# Patient Record
Sex: MALE | Race: OTHER | NOT HISPANIC OR LATINO | URBAN - METROPOLITAN AREA
[De-identification: names, ages, dates, MRNs, and addresses within clinical notes are randomized per-mention and may not be internally consistent; named-entity substitution may affect disease eponyms.]

---

## 2021-08-16 ENCOUNTER — INPATIENT (INPATIENT)
Facility: HOSPITAL | Age: 26
LOS: 6 days | Discharge: HOME | DRG: 917 | End: 2021-08-23
Attending: HOSPITALIST | Admitting: INTERNAL MEDICINE
Payer: MEDICAID

## 2021-08-16 VITALS
TEMPERATURE: 98 F | RESPIRATION RATE: 12 BRPM | DIASTOLIC BLOOD PRESSURE: 66 MMHG | SYSTOLIC BLOOD PRESSURE: 110 MMHG | HEART RATE: 159 BPM | OXYGEN SATURATION: 100 %

## 2021-08-16 DIAGNOSIS — J96.01 ACUTE RESPIRATORY FAILURE WITH HYPOXIA: ICD-10-CM

## 2021-08-16 DIAGNOSIS — E87.6 HYPOKALEMIA: ICD-10-CM

## 2021-08-16 DIAGNOSIS — T44.3X2A POISONING BY OTHER PARASYMPATHOLYTICS [ANTICHOLINERGICS AND ANTIMUSCARINICS] AND SPASMOLYTICS, INTENTIONAL SELF-HARM, INITIAL ENCOUNTER: ICD-10-CM

## 2021-08-16 DIAGNOSIS — K71.10 TOXIC LIVER DISEASE WITH HEPATIC NECROSIS, WITHOUT COMA: ICD-10-CM

## 2021-08-16 DIAGNOSIS — E83.42 HYPOMAGNESEMIA: ICD-10-CM

## 2021-08-16 DIAGNOSIS — F22 DELUSIONAL DISORDERS: ICD-10-CM

## 2021-08-16 DIAGNOSIS — T43.622A POISONING BY AMPHETAMINES, INTENTIONAL SELF-HARM, INITIAL ENCOUNTER: ICD-10-CM

## 2021-08-16 DIAGNOSIS — D72.829 ELEVATED WHITE BLOOD CELL COUNT, UNSPECIFIED: ICD-10-CM

## 2021-08-16 DIAGNOSIS — R41.82 ALTERED MENTAL STATUS, UNSPECIFIED: ICD-10-CM

## 2021-08-16 DIAGNOSIS — F10.10 ALCOHOL ABUSE, UNCOMPLICATED: ICD-10-CM

## 2021-08-16 DIAGNOSIS — M62.82 RHABDOMYOLYSIS: ICD-10-CM

## 2021-08-16 DIAGNOSIS — F19.159 OTHER PSYCHOACTIVE SUBSTANCE ABUSE WITH PSYCHOACTIVE SUBSTANCE-INDUCED PSYCHOTIC DISORDER, UNSPECIFIED: ICD-10-CM

## 2021-08-16 DIAGNOSIS — I51.7 CARDIOMEGALY: ICD-10-CM

## 2021-08-16 DIAGNOSIS — D64.9 ANEMIA, UNSPECIFIED: ICD-10-CM

## 2021-08-16 DIAGNOSIS — T39.1X2A POISONING BY 4-AMINOPHENOL DERIVATIVES, INTENTIONAL SELF-HARM, INITIAL ENCOUNTER: ICD-10-CM

## 2021-08-16 DIAGNOSIS — G93.41 METABOLIC ENCEPHALOPATHY: ICD-10-CM

## 2021-08-16 DIAGNOSIS — T50.6X2A: ICD-10-CM

## 2021-08-16 LAB
ALBUMIN SERPL ELPH-MCNC: 4.5 G/DL — SIGNIFICANT CHANGE UP (ref 3.4–5)
ALBUMIN SERPL ELPH-MCNC: 4.7 G/DL — SIGNIFICANT CHANGE UP (ref 3.3–5)
ALP SERPL-CCNC: 54 U/L — SIGNIFICANT CHANGE UP (ref 40–120)
ALP SERPL-CCNC: 60 U/L — SIGNIFICANT CHANGE UP (ref 40–120)
ALT FLD-CCNC: 32 U/L — SIGNIFICANT CHANGE UP (ref 10–45)
ALT FLD-CCNC: 48 U/L — HIGH (ref 12–42)
AMPHET UR-MCNC: POSITIVE
ANION GAP SERPL CALC-SCNC: 20 MMOL/L — HIGH (ref 5–17)
ANION GAP SERPL CALC-SCNC: 26 MMOL/L — HIGH (ref 9–16)
APAP SERPL-MCNC: >300 UG/ML — CRITICAL HIGH (ref 10–30)
APPEARANCE UR: CLEAR — SIGNIFICANT CHANGE UP
APTT BLD: 32.3 SEC — SIGNIFICANT CHANGE UP (ref 27.5–35.5)
AST SERPL-CCNC: 78 U/L — HIGH (ref 10–40)
AST SERPL-CCNC: 99 U/L — HIGH (ref 15–37)
BACTERIA # UR AUTO: PRESENT /HPF
BARBITURATES UR SCN-MCNC: NEGATIVE — SIGNIFICANT CHANGE UP
BASOPHILS # BLD AUTO: 0.04 K/UL — SIGNIFICANT CHANGE UP (ref 0–0.2)
BASOPHILS # BLD AUTO: 0.05 K/UL — SIGNIFICANT CHANGE UP (ref 0–0.2)
BASOPHILS NFR BLD AUTO: 0.4 % — SIGNIFICANT CHANGE UP (ref 0–2)
BASOPHILS NFR BLD AUTO: 0.6 % — SIGNIFICANT CHANGE UP (ref 0–2)
BENZODIAZ UR-MCNC: NEGATIVE — SIGNIFICANT CHANGE UP
BILIRUB SERPL-MCNC: 0.8 MG/DL — SIGNIFICANT CHANGE UP (ref 0.2–1.2)
BILIRUB SERPL-MCNC: 0.9 MG/DL — SIGNIFICANT CHANGE UP (ref 0.2–1.2)
BILIRUB UR-MCNC: NEGATIVE — SIGNIFICANT CHANGE UP
BLD GP AB SCN SERPL QL: NEGATIVE — SIGNIFICANT CHANGE UP
BLD GP AB SCN SERPL QL: NEGATIVE — SIGNIFICANT CHANGE UP
BUN SERPL-MCNC: 10 MG/DL — SIGNIFICANT CHANGE UP (ref 7–23)
BUN SERPL-MCNC: 6 MG/DL — LOW (ref 7–23)
CALCIUM SERPL-MCNC: 7.6 MG/DL — LOW (ref 8.4–10.5)
CALCIUM SERPL-MCNC: 9.1 MG/DL — SIGNIFICANT CHANGE UP (ref 8.5–10.5)
CHLORIDE SERPL-SCNC: 97 MMOL/L — SIGNIFICANT CHANGE UP (ref 96–108)
CHLORIDE SERPL-SCNC: 98 MMOL/L — SIGNIFICANT CHANGE UP (ref 96–108)
CK SERPL-CCNC: 2534 U/L — HIGH (ref 30–200)
CO2 SERPL-SCNC: 14 MMOL/L — LOW (ref 22–31)
CO2 SERPL-SCNC: 20 MMOL/L — LOW (ref 22–31)
COCAINE METAB.OTHER UR-MCNC: NEGATIVE — SIGNIFICANT CHANGE UP
COLOR SPEC: YELLOW — SIGNIFICANT CHANGE UP
CREAT SERPL-MCNC: 0.62 MG/DL — SIGNIFICANT CHANGE UP (ref 0.5–1.3)
CREAT SERPL-MCNC: 1.16 MG/DL — SIGNIFICANT CHANGE UP (ref 0.5–1.3)
DIFF PNL FLD: NEGATIVE — SIGNIFICANT CHANGE UP
EOSINOPHIL # BLD AUTO: 0.04 K/UL — SIGNIFICANT CHANGE UP (ref 0–0.5)
EOSINOPHIL # BLD AUTO: 0.19 K/UL — SIGNIFICANT CHANGE UP (ref 0–0.5)
EOSINOPHIL NFR BLD AUTO: 0.4 % — SIGNIFICANT CHANGE UP (ref 0–6)
EOSINOPHIL NFR BLD AUTO: 2.5 % — SIGNIFICANT CHANGE UP (ref 0–6)
EPI CELLS # UR: ABNORMAL /HPF (ref 0–5)
ETHANOL SERPL-MCNC: 82 MG/DL — HIGH
GAS PNL BLDA: SIGNIFICANT CHANGE UP
GLUCOSE BLDC GLUCOMTR-MCNC: 275 MG/DL — HIGH (ref 70–99)
GLUCOSE SERPL-MCNC: 163 MG/DL — HIGH (ref 70–99)
GLUCOSE SERPL-MCNC: 181 MG/DL — HIGH (ref 70–99)
GLUCOSE UR QL: NEGATIVE — SIGNIFICANT CHANGE UP
HCT VFR BLD CALC: 42.9 % — SIGNIFICANT CHANGE UP (ref 39–50)
HCT VFR BLD CALC: 45 % — SIGNIFICANT CHANGE UP (ref 39–50)
HGB BLD-MCNC: 14.8 G/DL — SIGNIFICANT CHANGE UP (ref 13–17)
HGB BLD-MCNC: 15.8 G/DL — SIGNIFICANT CHANGE UP (ref 13–17)
HYALINE CASTS # UR AUTO: ABNORMAL /LPF (ref 0–2)
IMM GRANULOCYTES NFR BLD AUTO: 0.2 % — SIGNIFICANT CHANGE UP (ref 0–1.5)
IMM GRANULOCYTES NFR BLD AUTO: 0.5 % — SIGNIFICANT CHANGE UP (ref 0–1.5)
INR BLD: 1.01 — SIGNIFICANT CHANGE UP (ref 0.88–1.16)
KETONES UR-MCNC: 15 MG/DL
LACTATE SERPL-SCNC: 10 MMOL/L — CRITICAL HIGH (ref 0.4–2)
LACTATE SERPL-SCNC: 4.7 MMOL/L — CRITICAL HIGH (ref 0.5–2)
LACTATE SERPL-SCNC: 7.1 MMOL/L — CRITICAL HIGH (ref 0.4–2)
LEUKOCYTE ESTERASE UR-ACNC: NEGATIVE — SIGNIFICANT CHANGE UP
LYMPHOCYTES # BLD AUTO: 0.83 K/UL — LOW (ref 1–3.3)
LYMPHOCYTES # BLD AUTO: 2.94 K/UL — SIGNIFICANT CHANGE UP (ref 1–3.3)
LYMPHOCYTES # BLD AUTO: 38.2 % — SIGNIFICANT CHANGE UP (ref 13–44)
LYMPHOCYTES # BLD AUTO: 9.3 % — LOW (ref 13–44)
MAGNESIUM SERPL-MCNC: 1 MG/DL — CRITICAL LOW (ref 1.6–2.6)
MAGNESIUM SERPL-MCNC: 1.8 MG/DL — SIGNIFICANT CHANGE UP (ref 1.6–2.6)
MCHC RBC-ENTMCNC: 33.4 PG — SIGNIFICANT CHANGE UP (ref 27–34)
MCHC RBC-ENTMCNC: 33.5 PG — SIGNIFICANT CHANGE UP (ref 27–34)
MCHC RBC-ENTMCNC: 34.5 GM/DL — SIGNIFICANT CHANGE UP (ref 32–36)
MCHC RBC-ENTMCNC: 35.1 GM/DL — SIGNIFICANT CHANGE UP (ref 32–36)
MCV RBC AUTO: 95.5 FL — SIGNIFICANT CHANGE UP (ref 80–100)
MCV RBC AUTO: 96.8 FL — SIGNIFICANT CHANGE UP (ref 80–100)
METHADONE UR-MCNC: NEGATIVE — SIGNIFICANT CHANGE UP
MONOCYTES # BLD AUTO: 0.56 K/UL — SIGNIFICANT CHANGE UP (ref 0–0.9)
MONOCYTES # BLD AUTO: 0.75 K/UL — SIGNIFICANT CHANGE UP (ref 0–0.9)
MONOCYTES NFR BLD AUTO: 6.3 % — SIGNIFICANT CHANGE UP (ref 2–14)
MONOCYTES NFR BLD AUTO: 9.7 % — SIGNIFICANT CHANGE UP (ref 2–14)
NEUTROPHILS # BLD AUTO: 3.73 K/UL — SIGNIFICANT CHANGE UP (ref 1.8–7.4)
NEUTROPHILS # BLD AUTO: 7.42 K/UL — HIGH (ref 1.8–7.4)
NEUTROPHILS NFR BLD AUTO: 48.5 % — SIGNIFICANT CHANGE UP (ref 43–77)
NEUTROPHILS NFR BLD AUTO: 83.4 % — HIGH (ref 43–77)
NITRITE UR-MCNC: NEGATIVE — SIGNIFICANT CHANGE UP
NRBC # BLD: 0 /100 WBCS — SIGNIFICANT CHANGE UP (ref 0–0)
NRBC # BLD: 0 /100 WBCS — SIGNIFICANT CHANGE UP (ref 0–0)
OPIATES UR-MCNC: NEGATIVE — SIGNIFICANT CHANGE UP
PCO2 BLDA: 36 MMHG — SIGNIFICANT CHANGE UP (ref 35–48)
PCP SPEC-MCNC: SIGNIFICANT CHANGE UP
PCP UR-MCNC: NEGATIVE — SIGNIFICANT CHANGE UP
PH BLDA: 7.38 — SIGNIFICANT CHANGE UP (ref 7.35–7.45)
PH UR: 5.5 — SIGNIFICANT CHANGE UP (ref 5–8)
PHOSPHATE SERPL-MCNC: 2.8 MG/DL — SIGNIFICANT CHANGE UP (ref 2.5–4.5)
PLATELET # BLD AUTO: 260 K/UL — SIGNIFICANT CHANGE UP (ref 150–400)
PLATELET # BLD AUTO: 306 K/UL — SIGNIFICANT CHANGE UP (ref 150–400)
PO2 BLDA: 464 MMHG — HIGH (ref 83–108)
POTASSIUM SERPL-MCNC: 2.7 MMOL/L — CRITICAL LOW (ref 3.5–5.3)
POTASSIUM SERPL-MCNC: 3.1 MMOL/L — LOW (ref 3.5–5.3)
POTASSIUM SERPL-SCNC: 2.7 MMOL/L — CRITICAL LOW (ref 3.5–5.3)
POTASSIUM SERPL-SCNC: 3.1 MMOL/L — LOW (ref 3.5–5.3)
PROT SERPL-MCNC: 7.6 G/DL — SIGNIFICANT CHANGE UP (ref 6–8.3)
PROT SERPL-MCNC: 8.7 G/DL — HIGH (ref 6.4–8.2)
PROT UR-MCNC: 100 MG/DL
PROTHROM AB SERPL-ACNC: 12.1 SEC — SIGNIFICANT CHANGE UP (ref 10.6–13.6)
RBC # BLD: 4.43 M/UL — SIGNIFICANT CHANGE UP (ref 4.2–5.8)
RBC # BLD: 4.71 M/UL — SIGNIFICANT CHANGE UP (ref 4.2–5.8)
RBC # FLD: 12 % — SIGNIFICANT CHANGE UP (ref 10.3–14.5)
RBC # FLD: 12.2 % — SIGNIFICANT CHANGE UP (ref 10.3–14.5)
RBC CASTS # UR COMP ASSIST: < 5 /HPF — SIGNIFICANT CHANGE UP
RH IG SCN BLD-IMP: POSITIVE — SIGNIFICANT CHANGE UP
RH IG SCN BLD-IMP: POSITIVE — SIGNIFICANT CHANGE UP
SALICYLATES SERPL-MCNC: 0.5 MG/DL — LOW (ref 2.8–20)
SAO2 % BLDA: 99 % — SIGNIFICANT CHANGE UP (ref 95–100)
SARS-COV-2 RNA SPEC QL NAA+PROBE: SIGNIFICANT CHANGE UP
SODIUM SERPL-SCNC: 137 MMOL/L — SIGNIFICANT CHANGE UP (ref 132–145)
SODIUM SERPL-SCNC: 138 MMOL/L — SIGNIFICANT CHANGE UP (ref 135–145)
SP GR SPEC: >=1.03 — SIGNIFICANT CHANGE UP (ref 1–1.03)
THC UR QL: NEGATIVE — SIGNIFICANT CHANGE UP
TROPONIN I SERPL-MCNC: <0.017 NG/ML — LOW (ref 0.02–0.06)
UROBILINOGEN FLD QL: 0.2 E.U./DL — SIGNIFICANT CHANGE UP
WBC # BLD: 7.7 K/UL — SIGNIFICANT CHANGE UP (ref 3.8–10.5)
WBC # BLD: 8.91 K/UL — SIGNIFICANT CHANGE UP (ref 3.8–10.5)
WBC # FLD AUTO: 7.7 K/UL — SIGNIFICANT CHANGE UP (ref 3.8–10.5)
WBC # FLD AUTO: 8.91 K/UL — SIGNIFICANT CHANGE UP (ref 3.8–10.5)
WBC UR QL: < 5 /HPF — SIGNIFICANT CHANGE UP

## 2021-08-16 PROCEDURE — 70450 CT HEAD/BRAIN W/O DYE: CPT | Mod: 26

## 2021-08-16 PROCEDURE — 99291 CRITICAL CARE FIRST HOUR: CPT

## 2021-08-16 PROCEDURE — 31500 INSERT EMERGENCY AIRWAY: CPT

## 2021-08-16 PROCEDURE — 93010 ELECTROCARDIOGRAM REPORT: CPT

## 2021-08-16 PROCEDURE — 99222 1ST HOSP IP/OBS MODERATE 55: CPT

## 2021-08-16 PROCEDURE — 71045 X-RAY EXAM CHEST 1 VIEW: CPT | Mod: 26,77

## 2021-08-16 PROCEDURE — 99291 CRITICAL CARE FIRST HOUR: CPT | Mod: 25

## 2021-08-16 PROCEDURE — 71045 X-RAY EXAM CHEST 1 VIEW: CPT | Mod: 26

## 2021-08-16 PROCEDURE — 93010 ELECTROCARDIOGRAM REPORT: CPT | Mod: 59,76

## 2021-08-16 RX ORDER — MIDAZOLAM HYDROCHLORIDE 1 MG/ML
0.02 INJECTION, SOLUTION INTRAMUSCULAR; INTRAVENOUS
Qty: 100 | Refills: 0 | Status: DISCONTINUED | OUTPATIENT
Start: 2021-08-16 | End: 2021-08-17

## 2021-08-16 RX ORDER — SODIUM CHLORIDE 9 MG/ML
1000 INJECTION, SOLUTION INTRAVENOUS
Refills: 0 | Status: DISCONTINUED | OUTPATIENT
Start: 2021-08-16 | End: 2021-08-20

## 2021-08-16 RX ORDER — ROCURONIUM BROMIDE 10 MG/ML
72 VIAL (ML) INTRAVENOUS ONCE
Refills: 0 | Status: COMPLETED | OUTPATIENT
Start: 2021-08-16 | End: 2021-08-16

## 2021-08-16 RX ORDER — ACETYLCYSTEINE 200 MG/ML
9 VIAL (ML) MISCELLANEOUS ONCE
Refills: 0 | Status: COMPLETED | OUTPATIENT
Start: 2021-08-16 | End: 2021-08-16

## 2021-08-16 RX ORDER — POTASSIUM CHLORIDE 20 MEQ
10 PACKET (EA) ORAL ONCE
Refills: 0 | Status: COMPLETED | OUTPATIENT
Start: 2021-08-16 | End: 2021-08-16

## 2021-08-16 RX ORDER — GLUCAGON INJECTION, SOLUTION 0.5 MG/.1ML
1 INJECTION, SOLUTION SUBCUTANEOUS ONCE
Refills: 0 | Status: DISCONTINUED | OUTPATIENT
Start: 2021-08-16 | End: 2021-08-20

## 2021-08-16 RX ORDER — ENOXAPARIN SODIUM 100 MG/ML
40 INJECTION SUBCUTANEOUS EVERY 24 HOURS
Refills: 0 | Status: DISCONTINUED | OUTPATIENT
Start: 2021-08-16 | End: 2021-08-23

## 2021-08-16 RX ORDER — MAGNESIUM SULFATE 500 MG/ML
2 VIAL (ML) INJECTION ONCE
Refills: 0 | Status: COMPLETED | OUTPATIENT
Start: 2021-08-16 | End: 2021-08-16

## 2021-08-16 RX ORDER — POTASSIUM CHLORIDE 20 MEQ
10 PACKET (EA) ORAL
Refills: 0 | Status: COMPLETED | OUTPATIENT
Start: 2021-08-16 | End: 2021-08-17

## 2021-08-16 RX ORDER — ACETYLCYSTEINE 200 MG/ML
3 VIAL (ML) MISCELLANEOUS ONCE
Refills: 0 | Status: COMPLETED | OUTPATIENT
Start: 2021-08-16 | End: 2021-08-16

## 2021-08-16 RX ORDER — ACETYLCYSTEINE 200 MG/ML
6 VIAL (ML) MISCELLANEOUS ONCE
Refills: 0 | Status: COMPLETED | OUTPATIENT
Start: 2021-08-17 | End: 2021-08-17

## 2021-08-16 RX ORDER — DEXTROSE 50 % IN WATER 50 %
25 SYRINGE (ML) INTRAVENOUS ONCE
Refills: 0 | Status: DISCONTINUED | OUTPATIENT
Start: 2021-08-16 | End: 2021-08-20

## 2021-08-16 RX ORDER — ETOMIDATE 2 MG/ML
18 INJECTION INTRAVENOUS ONCE
Refills: 0 | Status: COMPLETED | OUTPATIENT
Start: 2021-08-16 | End: 2021-08-16

## 2021-08-16 RX ORDER — DEXTROSE 50 % IN WATER 50 %
15 SYRINGE (ML) INTRAVENOUS ONCE
Refills: 0 | Status: DISCONTINUED | OUTPATIENT
Start: 2021-08-16 | End: 2021-08-20

## 2021-08-16 RX ORDER — SODIUM BICARBONATE 1 MEQ/ML
50 SYRINGE (ML) INTRAVENOUS ONCE
Refills: 0 | Status: COMPLETED | OUTPATIENT
Start: 2021-08-16 | End: 2021-08-16

## 2021-08-16 RX ORDER — ACTIVATED CHARCOAL 25 G/120ML
50 SUSPENSION, ORAL (FINAL DOSE FORM) ORAL ONCE
Refills: 0 | Status: COMPLETED | OUTPATIENT
Start: 2021-08-16 | End: 2021-08-16

## 2021-08-16 RX ORDER — CHLORHEXIDINE GLUCONATE 213 G/1000ML
15 SOLUTION TOPICAL EVERY 12 HOURS
Refills: 0 | Status: DISCONTINUED | OUTPATIENT
Start: 2021-08-16 | End: 2021-08-18

## 2021-08-16 RX ORDER — SODIUM BICARBONATE 1 MEQ/ML
0.38 SYRINGE (ML) INTRAVENOUS
Qty: 150 | Refills: 0 | Status: DISCONTINUED | OUTPATIENT
Start: 2021-08-16 | End: 2021-08-17

## 2021-08-16 RX ORDER — POTASSIUM CHLORIDE 20 MEQ
40 PACKET (EA) ORAL ONCE
Refills: 0 | Status: COMPLETED | OUTPATIENT
Start: 2021-08-16 | End: 2021-08-16

## 2021-08-16 RX ORDER — DEXTROSE 50 % IN WATER 50 %
12.5 SYRINGE (ML) INTRAVENOUS ONCE
Refills: 0 | Status: DISCONTINUED | OUTPATIENT
Start: 2021-08-16 | End: 2021-08-20

## 2021-08-16 RX ORDER — SODIUM CHLORIDE 9 MG/ML
2000 INJECTION INTRAMUSCULAR; INTRAVENOUS; SUBCUTANEOUS ONCE
Refills: 0 | Status: COMPLETED | OUTPATIENT
Start: 2021-08-16 | End: 2021-08-16

## 2021-08-16 RX ORDER — PANTOPRAZOLE SODIUM 20 MG/1
40 TABLET, DELAYED RELEASE ORAL EVERY 24 HOURS
Refills: 0 | Status: DISCONTINUED | OUTPATIENT
Start: 2021-08-16 | End: 2021-08-19

## 2021-08-16 RX ORDER — CHLORHEXIDINE GLUCONATE 213 G/1000ML
1 SOLUTION TOPICAL
Refills: 0 | Status: DISCONTINUED | OUTPATIENT
Start: 2021-08-16 | End: 2021-08-19

## 2021-08-16 RX ORDER — INSULIN LISPRO 100/ML
VIAL (ML) SUBCUTANEOUS EVERY 6 HOURS
Refills: 0 | Status: DISCONTINUED | OUTPATIENT
Start: 2021-08-16 | End: 2021-08-19

## 2021-08-16 RX ORDER — MIDAZOLAM HYDROCHLORIDE 1 MG/ML
0.02 INJECTION, SOLUTION INTRAMUSCULAR; INTRAVENOUS
Qty: 100 | Refills: 0 | Status: DISCONTINUED | OUTPATIENT
Start: 2021-08-16 | End: 2021-08-16

## 2021-08-16 RX ADMIN — Medication 72 MILLIGRAM(S): at 17:19

## 2021-08-16 RX ADMIN — Medication 50 GRAM(S): at 17:36

## 2021-08-16 RX ADMIN — Medication 6: at 23:06

## 2021-08-16 RX ADMIN — Medication 100 MILLIEQUIVALENT(S): at 23:33

## 2021-08-16 RX ADMIN — MIDAZOLAM HYDROCHLORIDE 1.2 MG/KG/HR: 1 INJECTION, SOLUTION INTRAMUSCULAR; INTRAVENOUS at 21:05

## 2021-08-16 RX ADMIN — Medication 50 MILLIEQUIVALENT(S): at 17:49

## 2021-08-16 RX ADMIN — MIDAZOLAM HYDROCHLORIDE 1.2 MG/KG/HR: 1 INJECTION, SOLUTION INTRAMUSCULAR; INTRAVENOUS at 17:58

## 2021-08-16 RX ADMIN — PANTOPRAZOLE SODIUM 40 MILLIGRAM(S): 20 TABLET, DELAYED RELEASE ORAL at 22:15

## 2021-08-16 RX ADMIN — Medication 50 GRAM(S): at 18:30

## 2021-08-16 RX ADMIN — Medication 50 GRAM(S): at 22:15

## 2021-08-16 RX ADMIN — Medication 50 MILLIEQUIVALENT(S): at 17:51

## 2021-08-16 RX ADMIN — ENOXAPARIN SODIUM 40 MILLIGRAM(S): 100 INJECTION SUBCUTANEOUS at 22:29

## 2021-08-16 RX ADMIN — Medication 100 MILLIEQUIVALENT(S): at 18:12

## 2021-08-16 RX ADMIN — Medication 100 MILLIEQUIVALENT(S): at 22:16

## 2021-08-16 RX ADMIN — Medication 128.75 GRAM(S): at 22:29

## 2021-08-16 RX ADMIN — Medication 295 GRAM(S): at 21:04

## 2021-08-16 RX ADMIN — ETOMIDATE 18 MILLIGRAM(S): 2 INJECTION INTRAVENOUS at 17:19

## 2021-08-16 RX ADMIN — Medication 1000 MEQ/KG/HR: at 19:14

## 2021-08-16 RX ADMIN — SODIUM CHLORIDE 2000 MILLILITER(S): 9 INJECTION INTRAMUSCULAR; INTRAVENOUS; SUBCUTANEOUS at 17:36

## 2021-08-16 RX ADMIN — Medication 40 MILLIEQUIVALENT(S): at 23:23

## 2021-08-16 RX ADMIN — Medication 100 MILLIEQUIVALENT(S): at 19:06

## 2021-08-16 NOTE — H&P ADULT - HISTORY OF PRESENT ILLNESS
HPI:        Of note,        Patient last admitted,      In the MICU,  VS: T 94.5F 34.7C, , BP  138/86(106), RR 22, SpO2 99% VENT  Labs: Lactate 10 > 7.1 > 4.7, Mg 1.0, K2.8, AG 26, Prot 8.7, AST/ALT 99/48, Trop <0.017. ABG 7.38/36/14, Acetaminophen >300, Salicylate 0.5, Alcohol 82  CBC w diff, PT/INR, PTT WNL,    Urine: Ketone 15, Prot 100, SG >1.030, Bacteria present, Hyaline casts few. Amphetamine positive  EKG:  CXR:  Imaging: Other  Orders: Midozolam 0.12 mg/kg/hr, NaHCO3 0.381 mEq/kg/hr   HPI:        Of note,        Patient last admitted,      In the MICU,  VS: T 94.5F 34.7C, , BP  138/86(106), RR 22, SpO2 99% VENT  Labs: Lactate 10 > 7.1 > 4.7, Mg 1.0, K2.8, AG 26, Prot 8.7, AST/ALT 99/48, Trop <0.017. ABG 7.38/36/14, Acetaminophen >300, Salicylate 0.5, Alcohol 82  CBC w diff, PT/INR, PTT WNL,    Urine: Ketone 15, Prot 100, SG >1.030, Bacteria present, Hyaline casts few. Amphetamine positive  EKG:  CXR: No acute cardiopulmonary disease process  CT Head: No acute intracranial hemorrhage or transcortical infarction  Orders: Etomidate 18mg IVP, Rocuronium 72mg IVP, NS 0.9% 2L, Midazolam 0.12 mg/kg/hr, NaHCO3 0.381 mEq/kg/hr   HPI: 25M BIB EMS d/t AMS from suspected OD. Pt called EMS himself, reported heavy alcohol use f/b intentional overdose of Unisom (doxylamine) and Benadyl.     EMS noted pt having blue residue over mouth and nose, denies witnessed vomiting. EMS reports he was alert on their arrival but lost mental status en route. No other medications found on scene.    No previous admissions on record. No collateral available.    In the MICU,  VS: T 94.5F 34.7C, , BP  138/86(106), RR 22, SpO2 99% VENT  Labs: Lactate 10 > 7.1 > 4.7, Mg 1.0, K2.8, AG 26, Prot 8.7, AST/ALT 99/48, Trop <0.017. ABG 7.38/36/14, Acetaminophen >300, Salicylate 0.5, Alcohol 82  CBC w diff, PT/INR, PTT WNL,    Urine: Ketone 15, Prot 100, SG >1.030, Bacteria present, Hyaline casts few. Amphetamine positive  EKG: Sinus tach, Biatrial enlargement, LVH w repolarization abnormality, Cannot r/o inferior infarct age undeterminate  CXR: No acute cardiopulmonary disease process  CT Head: No acute intracranial hemorrhage or transcortical infarction  Orders: Etomidate 18mg IVP, Rocuronium 72mg IVP, NS 0.9% 2L, Charcoal 50mg PO, NaHCO3 100mEq IVP, Midazolam 0.12 mg/kg/hr, NaHCO3 0.381 mEq/kg/hr   HPI: 25M BIB EMS d/t AMS from suspected OD. Pt called EMS himself, reported heavy alcohol use f/b intentional overdose of Unisom (doxylamine) and Benadyl.     EMS noted pt having blue residue over mouth and nose, denies witnessed vomiting. EMS reports he was alert on their arrival but lost mental status en route. No other medications found on scene.    No previous admissions on record. No collateral available.    In the MICU,  VS: T 94.5F 34.7C, , /86(106), Vent AC/VC 40/400/18/5 sat 99%   Labs: Lactate 10 > 7.1 > 4.7, Mg 1.0, K2.8, AG 26, Prot 8.7, AST/ALT 99/48, Trop <0.017. ABG 7.38/36/14, Acetaminophen >300, Salicylate 0.5, Alcohol 82  CBC w diff, PT/INR, PTT WNL,    Urine: Ketone 15, Prot 100, SG >1.030, Bacteria present, Hyaline casts few. Amphetamine positive  EKG: Sinus tach, Biatrial enlargement, LVH w repolarization abnormality, Cannot r/o inferior infarct age indeterminate  CXR: No acute cardiopulmonary disease process  CT Head: No acute intracranial hemorrhage or transcortical infarction  Orders: Etomidate 18mg IVP, Rocuronium 72mg IVP, NS 0.9% 2L, Charcoal 50mg PO, NaHCO3 100mEq IVP, Midazolam 0.12 mg/kg/hr, NaHCO3 0.381 mEq/kg/hr, KCl 20mEq IV, Mg 2g IVPB, NaHCO3 infusion 150mEq, Acetylcysteine 9g IVPB, Midazolam infusion 100mg, Protonix 40mg IVP   HPI: 25M BIB EMS d/t AMS from suspected drug & alcohol OD. Pt called EMS himself, reported heavy alcohol use f/b intentional overdose of Unisom (doxylamine) and Benadyl.     EMS noted pt having blue residue over mouth and nose, denies witnessed vomiting. EMS reports he was alert on their arrival but lost mental status en route. No other medications found on scene.    No previous admissions on record. No collateral available.    In the MICU,  VS: T 94.5F 34.7C, , /86(106), Vent AC/VC 40/400/18/5 sat 98%   Labs: Lactate 10 > 7.1 > 4.7, Mg 1.0, K2.8, AG 26, Prot 8.7, AST/ALT 99/48, Trop <0.017. ABG 7.38/36/14, Acetaminophen >300, Salicylate 0.5, Alcohol 82.  (CBC w diff, PT/INR, PTT WNL)  Urine: Ketones 15, Prot 100, SG >1.030, Bacteria present, Hyaline casts few. Amphetamine positive  EKG: Sinus tach, Biatrial enlargement, LVH w repolarization abnormality, Cannot r/o inferior infarct age indeterminate  CXR: No acute cardiopulmonary disease process  CT Head: No acute intracranial hemorrhage or transcortical infarction  Orders: Etomidate 18mg IVP, Rocuronium 72mg IVP, NS 0.9% 2L, Charcoal 50mg PO, NaHCO3 100mEq IVP, Midazolam 0.12 mg/kg/hr, NaHCO3 0.381 mEq/kg/hr, KCl 20mEq IV, Mg 2g IVPB, NaHCO3 infusion 150mEq, Acetylcysteine 9g IVPB, Midazolam infusion 100mg, Protonix 40mg IVP   HPI: 25M BIB EMS d/t AMS from suspected drug & alcohol OD. Pt called EMS himself, reported heavy alcohol use f/b intentional overdose of Unisom (doxylamine) Tylenol, & Benadryl     EMS noted pt having blue residue over mouth and nose, denies witnessed vomiting. EMS reports he was alert on their arrival but lost mental status en route. No other medications found on scene.    No previous admissions on record. No collateral available.    In the MICU,  VS: T 94.5F 34.7C, , /86(106), Vent AC/VC 40/400/18/5 sat 98%   Labs: Lactate 10 > 7.1 > 4.7, Mg 1.0, K2.8, AG 26, Prot 8.7, AST/ALT 99/48, Trop <0.017. ABG 7.38/36/14, Acetaminophen >300, Salicylate 0.5, Alcohol 82.  (CBC w diff, PT/INR, PTT WNL)  Urine: Ketones 15, Prot 100, SG >1.030, Bacteria present, Hyaline casts few. Amphetamine positive  EKG: Sinus tach, Biatrial enlargement, LVH w repolarization abnormality, Cannot r/o inferior infarct age indeterminate  CXR: No acute cardiopulmonary disease process  CT Head: No acute intracranial hemorrhage or transcortical infarction  Orders: Etomidate 18mg IVP, Rocuronium 72mg IVP, NS 0.9% 2L, Charcoal 50mg PO, NaHCO3 100mEq IVP, Midazolam 0.12 mg/kg/hr, NaHCO3 0.381 mEq/kg/hr, KCl 20mEq IV, Mg 2g IVPB, NaHCO3 infusion 150mEq, Acetylcysteine 9g IVPB, Midazolam infusion 100mg, Protonix 40mg IVP

## 2021-08-16 NOTE — ED PROVIDER NOTE - PROGRESS NOTE DETAILS
Attempted to contact MICU via [facility] but no answer at this time.  [facility] will call me back once they get a hold of MICU attending. Attempted to contact MICU via [facility] but no answer at this time.  [facility] will call me back once they get a hold of MICU attending.  They called right back and we spoke with Dr. Simental from the MICU.  He recommends starting a bicarb drip and accepts pt for admission to MICU.

## 2021-08-16 NOTE — ED ADULT TRIAGE NOTE - CHIEF COMPLAINT QUOTE
pt biba from home acquaintance called 911 s/p intentional diphendramine overdose ingested unk amt of pills arrives somnolent tachy 159 breathing at 12 o2 sat 96 on room air 100 on nrb, upgraded md present at triage airway protected

## 2021-08-16 NOTE — ED PROVIDER NOTE - OBJECTIVE STATEMENT
Pt is a 24yo M who was BIB EMS due to AMS and reported overdose.  Pt is somnolent and unresponsive on arrival to history obtained from EMS.  They report pt called EMS himself and reported heavy alcohol use followed by an intentional overdose of Unisom (doxylamine) and Benadyl.  Pt noted to have blue residue over mouth and nose.  EMS denies witnessed vomiting.  they report he was alert on their arrival but lost mental status en route.  No other medications found on scene.  No meds had tylenol in them.

## 2021-08-16 NOTE — H&P ADULT - NSHPLABSRESULTS_GEN_ALL_CORE
LABS:                         14.8   8.91  )-----------( 260      ( 16 Aug 2021 20:44 )             42.9     08-16    138  |  98  |  6<L>  ----------------------------<  181<H>  3.1<L>   |  20<L>  |  0.62    Ca    7.6<L>      16 Aug 2021 20:44  Phos  2.8     08-16  Mg     1.8     08-16    TPro  7.6  /  Alb  4.7  /  TBili  0.8  /  DBili  x   /  AST  78<H>  /  ALT  32  /  AlkPhos  54  08-16    PT/INR - ( 16 Aug 2021 20:44 )   PT: 12.1 sec;   INR: 1.01       PTT - ( 16 Aug 2021 20:44 )  PTT:32.3 sec  Urinalysis Basic - ( 16 Aug 2021 17:39 )    Color: Yellow / Appearance: Clear / SG: >=1.030 / pH: x  Gluc: x / Ketone: 15 mg/dL  / Bili: NEGATIVE / Urobili: 0.2 E.U./dL   Blood: x / Protein: 100 mg/dL / Nitrite: NEGATIVE   Leuk Esterase: NEGATIVE / RBC: < 5 /HPF / WBC < 5 /HPF   Sq Epi: x / Non Sq Epi: 5-10 /HPF / Bacteria: Present /HPF    CARDIAC MARKERS ( 16 Aug 2021 20:44 )  x     / x     / 2534 U/L / x     / x      CARDIAC MARKERS ( 16 Aug 2021 17:26 )  <0.017 ng/mL / x     / x     / x     / x        Lactate, Blood: 4.7 mmol/L (08-16 @ 20:44)  Lactate, Blood: 7.1 mmoL/L (08-16 @ 18:12)  Lactate, Blood: 10.0 mmoL/L (08-16 @ 17:26)      RADIOLOGY, EKG & ADDITIONAL TESTS: Reviewed.

## 2021-08-16 NOTE — H&P ADULT - ASSESSMENT
25M BIB EMS d/t AMS from intentional alcohol & drug OD of Unisom (doxylamine) and Benadyl of unknown quantity. Found to have mild transaminitis with elevated serum acetaminophen, alcohol & positive urine amphetamines. Admitted to MICU for airway protection, monitoring of mental status & liver function.    NEURO  #Metabolic encephalopathy  -consult psych once mental status regained    #Acetaminophen OD    CARDIO    PULM  #Acute hypoxemic respiratory failure      GI  # Transaminitis  - f/u GI recs  - f/u poison control    ENDO  - on ISS    RENAL    HEMEONC    ID    MSK  #Anticholinergic poisoning    #Rhabdomyolysis    F: None   E: Replete as necessary K>4 Mg>2  N: NPO    DVT Prophylaxis: Lovenox 40mg SQ q24h  GI prophylaxis: Protonix 40mg IVP q24h   CODE STATUS: FULL 25M BIB EMS d/t AMS from intentional alcohol & drug OD of Unisom (doxylamine) and Benadyl of unknown quantity. Found to have mild transaminitis with elevated serum acetaminophen, alcohol & positive urine amphetamines. Admitted to MICU for airway protection, monitoring of mental status & liver function.    NEURO  #Metabolic encephalopathy      PSYCH  #Intentional Alcohol & Drug OD  - consult psych once mental status regained  - consider 1:1 if SI    CARDIO    PULM  #Acute hypoxemic respiratory failure  - Intubated & Sedated  - Versed for RASS 0 to -2  - Vent AC/VC: maintain SpO2 >90%      GI  # Hepatotoxicity 2/2 Acetaminophen OD  - @[facility] ED received charcoal 2x50mg PO, NAC  - f/u GI recs  - f/u poison control    ENDO  - on ISS    RENAL    HEMEONC    ID    MSK  #Anticholinergic poisoning    #Rhabdomyolysis    F: None   E: Replete as necessary K>4 Mg>2  N: NPO    DVT Prophylaxis: Lovenox 40mg SQ q24h  GI prophylaxis: Protonix 40mg IVP q24h   CODE STATUS: FULL 25M BIB EMS d/t AMS from intentional alcohol & drug OD of Unisom (doxylamine) and Benadyl of unknown quantity. Found to have mild transaminitis with elevated serum acetaminophen, alcohol & positive urine amphetamines. Admitted to MICU for airway protection, monitoring of mental status & liver function.    NEURO  #Metabolic encephalopathy likely 2/2 hepatotoxicity & electrolyte abnormalities  Hx of acetaminophen, anticholinergic & alcohol OD. Pt was alert on EMS arrival but lost mental status en route to [facility], AAOx0.  - Intubated & Sedated for airway protection  - Vent VC/AC 40/50  - Reversible etiologies treated  - Electrolytes repleted  - Received PO Charcoal & NAC    PSYCH  #Intentional Alcohol & Drug OD  - consult psych once mental status regained  - consider 1:1 if SI    CARDIO    PULM  #Acute hypoxemic respiratory failure  - Intubated & Sedated  - Versed for RASS 0 to -2  - Vent AC/VC: maintain SpO2 >90%      GI  # Hepatotoxicity 2/2 Acetaminophen OD  - @[facility] ED received charcoal 2x50mg PO, NAC  - f/u GI recs  - f/u poison control    ENDO  - on ISS    RENAL  #Hypomagnesemia    #Hypokalemia    HEMEONC    ID    MSK  #Anticholinergic poisoning    #Rhabdomyolysis    F: None   E: Replete as necessary K>4 Mg>2  N: NPO    DVT Prophylaxis: Lovenox 40mg SQ q24h  GI prophylaxis: Protonix 40mg IVP q24h   CODE STATUS: FULL 25M BIB EMS d/t AMS from intentional alcohol & drug OD of Unisom (doxylamine) Tylenol, & Benadryl of unknown quantity. Found to have mild transaminitis with elevated serum acetaminophen, alcohol & positive urine amphetamines. Admitted to MICU for airway protection, monitoring of mental status & liver function.    NEURO  #Metabolic encephalopathy likely 2/2 hepatotoxicity & electrolyte abnormalities  Hx of acetaminophen, anticholinergic & alcohol OD. Pt was alert on EMS arrival but lost mental status en route to [facility], AAOx0.  - Intubated & Sedated for airway protection  - Vent VC/AC 40/400/18/5, keep SpO2 >90%  - Versed ggt, RASS goal 0 to -2   -Underlying condition treated  - Electrolytes repleted  - Received PO Charcoal & NAC    PSYCH  #Intentional Alcohol & Drug OD  - consult psych once mental status regained  - consider 1:1 if SI    CARDIO: no active issues    PULM  #Acute hypoxemic respiratory failure  - Intubated & Sedated for airway protection  - Vent VC/AC 40/400/18/5, keep SpO2 >90%  - Versed ggt, RASS goal 0 to -2     GI  # Hepatotoxicity 2/2 Acetaminophen OD  - @[facility] ED received charcoal 2x50mg PO, NAC  - f/u GI recs  - f/u poison control    ENDO  - on ISS    RENAL  #Hypomagnesemia    #Hypokalemia    HEMEONC: no active issues    ID: no active issues    MSK  #Anticholinergic poisoning    #Rhabdomyolysis    F: None   E: Replete as necessary K>4 Mg>2  N: NPO    DVT Prophylaxis: Lovenox 40mg SQ q24h  GI prophylaxis: Protonix 40mg IVP q24h   CODE STATUS: FULL 25M BIB EMS d/t AMS from intentional alcohol & drug OD of Unisom (doxylamine) Tylenol, & Benadryl of unknown quantity. Found to have mild transaminitis with elevated serum acetaminophen, alcohol & positive urine amphetamines. Admitted to MICU for airway protection, monitoring of mental status & liver function.    NEURO  #Metabolic encephalopathy likely 2/2 hepatotoxicity & electrolyte abnormalities  Hx of acetaminophen, anticholinergic & alcohol OD. Pt was alert on EMS arrival but lost mental status en route to [facility], AAOx0.  - Intubated & Sedated for airway protection  - Vent VC/AC 40/400/18/5, keep SpO2 >90%  - Versed ggt, RASS goal 0 to -2   - Underlying conditions treated  - Electrolytes repleted  - Received PO Charcoal & NAC    PSYCH  #Intentional Alcohol & Drug OD  - consult psych once mental status regained  - consider 1:1 if SI    CARDIO: no active issues    PULM  #Acute hypoxemic respiratory failure  - Intubated & Sedated for airway protection  - Vent VC/AC 40/400/18/5, keep SpO2 >90%  - Versed ggt, RASS goal 0 to -2     GI  # Hepatotoxicity 2/2 Acetaminophen OD  - @[facility] ED received charcoal 2x50mg PO, NAC  - f/u GI recs  - f/u poison control    ENDO  - on ISS    RENAL  #Hypomagnesemia    #Hypokalemia    HEMEONC: no active issues    ID: no active issues    MSK  #Anticholinergic poisoning    #Rhabdomyolysis    F: None   E: Replete as necessary K>4 Mg>2  N: NPO    DVT Prophylaxis: Lovenox 40mg SQ q24h  GI prophylaxis: Protonix 40mg IVP q24h   CODE STATUS: FULL 25M BIB EMS d/t AMS from intentional alcohol & drug OD of Unisom (doxylamine) Tylenol, & Benadryl of unknown quantity. Found to have mild transaminitis with elevated serum acetaminophen, alcohol & positive urine amphetamines. Admitted to MICU for airway protection, monitoring of mental status & liver function.    NEURO  #Metabolic encephalopathy likely 2/2 hepatotoxicity & electrolyte abnormalities  Hx of acetaminophen, anticholinergic & alcohol OD. Pt was alert on EMS arrival but lost mental status en route to [facility], AAOx0.  - Intubated & Sedated for airway protection  - Vent VC/AC 40/400/18/5, keep SpO2 >90%  - Versed ggt, RASS goal 0 to -2   - Underlying conditions treated  - Electrolytes repleted  - Received PO Charcoal & NAC    PSYCH  #Intentional Alcohol & Drug OD  - consult psych once mental status regained  - consider 1:1 if SI    CARDIO: no active issues    PULM  #Acute hypoxemic respiratory failure  - Intubated & Sedated for airway protection  - Vent VC/AC 40/400/18/5, keep SpO2 >90%  - Versed ggt, RASS goal 0 to -2     GI  # Hepatotoxicity 2/2 Acetaminophen OD  - @[facility] ED received charcoal 2x50mg PO, NAC  LFTs  charcoal  NAC  - f/u GI recs  - f/u poison control    ENDO  - on ISS    RENAL  #Hypomagnesemia    #Hypokalemia    HEMEONC: no active issues    ID: no active issues    MSK  #Anticholinergic poisoning    #Rhabdomyolysis    F: None   E: Replete as necessary K>4 Mg>2  N: NPO    DVT Prophylaxis: Lovenox 40mg SQ q24h  GI prophylaxis: Protonix 40mg IVP q24h   CODE STATUS: FULL 25M BIB EMS d/t AMS from intentional alcohol & drug OD of Unisom (doxylamine) Tylenol, & Benadryl of unknown quantity. Found to have mild transaminitis with elevated serum acetaminophen, alcohol & positive urine amphetamines. Admitted to MICU for airway protection, monitoring of mental status & liver function.    NEURO  # Metabolic encephalopathy likely 2/2 hepatotoxicity & electrolyte abnormalities  Hx of acetaminophen, anticholinergic & alcohol OD. Pt was alert on EMS arrival but lost mental status en route to [facility], AAOx0.  - Intubated & Sedated for airway protection  - Vent VC/AC 40/400/18/5, keep SpO2 >90%  - Versed ggt, RASS goal 0 to -2   - NGT placed  - Underlying conditions treated  - Electrolytes repleted  - Received PO Charcoal & NAC    PSYCH  # Intentional Alcohol & Drug OD  - consult psych once mental status regained  - consider 1:1 if SI    CARDIO: no active issues    PULM  # Acute hypoxemic respiratory failure  - Intubated & Sedated for airway protection  - Vent VC/AC 40/400/18/5, keep SpO2 >90%  - Versed ggt, RASS goal 0 to -2   - NGT placed    GI  # Hepatotoxicity 2/2 Acetaminophen OD  Hx of Acetaminophen OD of unknown qualtity. At [facility] ED received charcoal 2x50mg PO, NAC 9g @ 21:04 on 8/16.  - AST 99 > 78  - ALT 48 > 32  - Trend LFTs q6h  - Reported to [place] poison control, w be contacted at 8/17 6PM for f/u  - f/u GI recs  - f/u poison control recs    ENDO  - on ISS    RENAL  # Hypomagnesemia  1.0 > 1.8 > 2.1  - Repleted    # Hypokalemia  2.7 > 3.1  - Repleted    HEMEONC: no active issues    ID: no active issues    MSK  # Anticholinergic poisoning  Hx of Benadryl OD  - hypothermic presentation disagrees with sx of anticholinergic syndrome  - Physostigmine not started    # c/f Rhabdomyolysis  - CK 2534    F: None   E: Replete as necessary K>4 Mg>2  N: NPO  DVT Prophylaxis: Lovenox 40mg SQ q24h  GI prophylaxis: Protonix 40mg IVP q24h   CODE STATUS: FULL 25M BIB EMS d/t AMS from intentional alcohol & drug OD of Unisom (doxylamine) Tylenol, & Benadryl of unknown quantity. Found to have mild transaminitis with elevated serum acetaminophen, alcohol & positive urine amphetamines. Admitted to MICU for airway protection, NAC infusion, monitoring of mental status & liver function.    NEURO  # Metabolic encephalopathy likely 2/2 hepatotoxicity & electrolyte abnormalities  Hx of acetaminophen, anticholinergic & alcohol OD. Pt was alert on EMS arrival but lost mental status en route to [facility], AAOx0.  - Intubated & Sedated for airway protection  - Vent VC/AC 40/400/18/5, keep SpO2 >90%  - Versed ggt, RASS goal 0 to -2   - NGT placed, position confirmed by radiology  - Underlying conditions treated (see below)  - Electrolytes repleted  - Received PO Charcoal & NAC    PSYCH  # Intentional Alcohol & Drug OD  - consult psych once mental status regained  - consider 1:1 if SI    CARDIO: no active issues    PULM  # Acute hypoxemic respiratory failure  - Intubated & Sedated for airway protection  - Vent VC/AC 40/400/18/5, keep SpO2 >90%  - Versed ggt, RASS goal 0 to -2   - NGT placed, position confirmed by radiology    GI  # Hepatotoxicity 2/2 Acetaminophen OD  Hx of Acetaminophen OD of unknown quantity. At [facility] ED received charcoal 2x50mg PO  - Started on NAC infusion 9g over 16hrs @ 21:04 on 8/16.  - AST 99 > 78 > 54  - ALT 48 > 32 > 27  - Trend LFTs q6h  - PT/INT, PTT WNL  - Case reported to [place] poison control #[phone]  - They w contact us for f/u on 8/17 around 6PM  - f/u GI recs (spoke w Dr. Johnson, GI fellow w f/u in AM)  - f/u poison control recs (left message for Lyn Pizarro #[id number])    ENDO  - on ISS    RENAL  # Hypomagnesemia  1.0 > 1.8 > 2.1  - Repleted    # Hypokalemia  2.7 > 3.1 > 3.0  - Received in total 10mEq x5 IV & 40mEq via NGT  - 1g calcium gluconate IVP for cardiac protection.    - Repleted    HEMEONC: no active issues    ID: no active issues    MSK  # Anticholinergic poisoning  Hx of Benadryl OD  - hypothermic presentation disagrees with sx of anticholinergic syndrome  - Physostigmine not started  - Calcium gluconate given for cardiac protection    # c/f Rhabdomyolysis  - CK 7602    F: None   E: Replete as necessary K>4 Mg>2  N: NPO  DVT Prophylaxis: Lovenox 40mg SQ q24h  GI prophylaxis: Protonix 40mg IVP q24h   CODE STATUS: FULL

## 2021-08-16 NOTE — ED PROVIDER NOTE - CARE PLAN
Principal Discharge DX:	Acute respiratory failure with hypoxia  Secondary Diagnosis:	Anticholinergic drug overdose   1

## 2021-08-16 NOTE — ED PROVIDER NOTE - CLINICAL SUMMARY MEDICAL DECISION MAKING FREE TEXT BOX
Anticholinergic overdose with respiratory failure/airway compromise.  Pt moved into resuscitation bay and intubated for airway protection.  Started on Versed drip for comfort.  Full AMS w/u performed.  Poison control center consulted by pharmacy and they recommend fluids, close EKG monitoring for QRS prolongation.  OG tube placed and lavage performed - minimal output of purplish blue liquid.  Activated charcoal given via OG tube.      EKGs showed widening QRS so sodium bicarbonate given and QRS stabilized.  Will consult and admit to MICU.

## 2021-08-16 NOTE — H&P ADULT - ATTENDING COMMENTS
25 year old male with unknown past medical history was sent from [facility]. The patient apparently overdosed on the OTC benadryl, dioxyline. The patient himself called EMS and was taken to the Ed. In the ED he was noted to be tachycardic and increasingly somnolent. He was then intubated for airway protection. Poison control was called by the ED and he was stared on bicarb drip, given activated charcoal and versed.  On arrival the patient was still intubated and on versed drip. he was tachycardic to 120s and hypothermic.   His imaging (CXR and CT head were unremarkable.) labs were significant for severe hypokalemia, hypomagnesemia, elevated CPK and elevated Tylenol level(>300). EKG showed widening QRS.  # acute hypoxemic respiratory failure  # metabolic encephalopathy  # anti cholinergic poisoning  # acetaminophen overdose  # rhabdomyolysis  Plan:  Admit to MICU  - maintain spo2> 90%  - Versed GTT for sedation, RASS 0 to -2  - Check Electrolytes, liver panel and CPK q 6 hours  - EKG q 6 hours, monitor qrs  - Aggressively replete electrolytes.  - Continue Bicarb drip @ 150 cc/hour  - NAC for 21 hours  - GI/ hepatology consult  - DVT ppx- lovenox 40 mg qhs  - GI PPx - PPi  - NPO for now.

## 2021-08-16 NOTE — ED PROVIDER NOTE - WR INTERPRETATION 1
CXR negative -  ET tube in proper location.  OG tube in proper location.  No nuha infiltrates, no pneumo.

## 2021-08-16 NOTE — H&P ADULT - NSHPPHYSICALEXAM_GEN_ALL_CORE
.  VITAL SIGNS:  T(C): 36.3 (08-16-21 @ 22:10), Max: 37.3 (08-16-21 @ 17:26)  T(F): 97.3 (08-16-21 @ 22:10), Max: 99.1 (08-16-21 @ 17:26)  HR: 109 (08-16-21 @ 23:00) (109 - 159)  BP: 123/66 (08-16-21 @ 23:00) (102/63 - 161/97)  BP(mean): 87 (08-16-21 @ 23:00) (86 - 106)  RR: 24 (08-16-21 @ 23:00) (12 - 24)  SpO2: 98% (08-16-21 @ 23:00) (96% - 100%)  Wt(kg): --    PHYSICAL EXAM:    Constitutional: WDWN resting comfortably in bed; NAD  Head: NC/AT  Eyes: PERRL, EOMI, clear conjunctiva  ENT: no nasal discharge; uvula midline, no oropharyngeal erythema or exudates; MMM  Neck: supple; no JVD or thyromegaly  Respiratory: CTA B/L; no W/R/R, no retractions  Cardiac: +S1/S2; RRR; no M/R/G;  Gastrointestinal: soft, NT/ND; no rebound or guarding; +BSx4  Extremities: WWP, no clubbing or cyanosis; no peripheral edema  Musculoskeletal: NROM x4; no joint swelling, tenderness or erythema  Vascular: 2+ radial, femoral, DP/PT pulses B/L  Dermatologic: skin warm, dry and intact; no rashes, wounds, or scars  Neurologic: unable to assess d/t sedation  Psychiatric: unable to assess d/t sedation VITAL SIGNS:  T(C): 36.3 (08-16-21 @ 22:10), Max: 37.3 (08-16-21 @ 17:26)  T(F): 97.3 (08-16-21 @ 22:10), Max: 99.1 (08-16-21 @ 17:26)  HR: 109 (08-16-21 @ 23:00) (109 - 159)  BP: 123/66 (08-16-21 @ 23:00) (102/63 - 161/97)  BP(mean): 87 (08-16-21 @ 23:00) (86 - 106)  RR: 24 (08-16-21 @ 23:00) (12 - 24)  SpO2: 98% (08-16-21 @ 23:00) (96% - 100%)  Wt(kg): --    PHYSICAL EXAM:  Constitutional: Intubate & Sedated; NAD  Head: NC/AT  Eyes: PERRL, EOMI, clear conjunctiva  ENT: no nasal discharge; uvula midline, no oropharyngeal erythema or exudates; MMM  Neck: supple; no JVD or thyromegaly  Respiratory: CTA B/L; no W/R/R, no retractions  Cardiac: +S1/S2; RRR; no M/R/G;  Gastrointestinal: soft, NT/ND; no rebound or guarding; +BSx4  Extremities: WWP, no clubbing or cyanosis; no peripheral edema  Musculoskeletal: NROM x4; no joint swelling, tenderness or erythema  Vascular: 2+ radial, femoral, DP/PT pulses B/L  Dermatologic: skin warm, dry and intact; no rashes, wounds, or scars  Neurologic: unable to assess d/t sedation  Psychiatric: unable to assess d/t sedation

## 2021-08-16 NOTE — ED PROVIDER NOTE - PHYSICAL EXAMINATION
VITAL SIGNS: I have reviewed nursing notes and confirm.  CONSTITUTIONAL: Unresponsive, even to deep sternal rub.  Does appear to be protecting his airway - gurgling heard with respirations.   SKIN: Skin is warm to touch and flushed throughout. +Piloerection.   HEAD: Normocephalic; atraumatic.  EYES: Mid-sized pupils and sluggish b/l.  ENT: No nasal discharge; airway clear.  NECK: Supple; non tender.  CARD: +Tachycardia and hypertension.   RESP: Coarse rhonchi in bases with upper airway gurgling.   ABD: Soft; non-distended; non-tender; no hepatosplenomegaly.  MSK: Normal ROM. No clubbing, cyanosis or edema.  NEURO: A&Oxzero and not localizing pain.  GCS - 5  PSYCH: Unable to evaluate but assumed SI.

## 2021-08-16 NOTE — ED ADULT NURSE NOTE - NS ED TRIAGE CLINICAL UPGRADE PROVIDER FT
tocco [[email or web address]] : In regards to patients Physical exam, routine blood work drawn, will review results with patient. EKG reviewed in office \par \par Counseling included abnormal lab results, differential diagnoses, treatment options, risks and benefits, lifestyle changes, prognosis, current condition, medications, and dose adjustments. \par The patient was interactive, attentive, asked questions, and verbalized understanding

## 2021-08-16 NOTE — ED PROVIDER NOTE - ATTENDING CONTRIBUTION TO CARE
The patient was seen immediately upon arrival due to a high probability of imminent or life-threatening deterioration secondary to anti-cholinergic syndrome 2/2 intentional overdose, which required my direct attention, intervention, and personal management at the bedside. I have personally provided critical care time exclusive of time spent on separately billable procedures. Time includes review of laboratory data, radiology results, discussion with consultants, discussion with the patient's family, and monitoring for potential decompensation.

## 2021-08-17 LAB
A1C WITH ESTIMATED AVERAGE GLUCOSE RESULT: 5 % — SIGNIFICANT CHANGE UP (ref 4–5.6)
ALBUMIN SERPL ELPH-MCNC: 3.5 G/DL — SIGNIFICANT CHANGE UP (ref 3.3–5)
ALBUMIN SERPL ELPH-MCNC: 3.7 G/DL — SIGNIFICANT CHANGE UP (ref 3.3–5)
ALBUMIN SERPL ELPH-MCNC: 3.8 G/DL — SIGNIFICANT CHANGE UP (ref 3.3–5)
ALBUMIN SERPL ELPH-MCNC: 4.1 G/DL — SIGNIFICANT CHANGE UP (ref 3.3–5)
ALP SERPL-CCNC: 37 U/L — LOW (ref 40–120)
ALP SERPL-CCNC: 39 U/L — LOW (ref 40–120)
ALP SERPL-CCNC: 39 U/L — LOW (ref 40–120)
ALP SERPL-CCNC: 43 U/L — SIGNIFICANT CHANGE UP (ref 40–120)
ALT FLD-CCNC: 25 U/L — SIGNIFICANT CHANGE UP (ref 10–45)
ALT FLD-CCNC: 27 U/L — SIGNIFICANT CHANGE UP (ref 10–45)
ALT FLD-CCNC: 33 U/L — SIGNIFICANT CHANGE UP (ref 10–45)
ALT FLD-CCNC: 66 U/L — HIGH (ref 10–45)
ANION GAP SERPL CALC-SCNC: 10 MMOL/L — SIGNIFICANT CHANGE UP (ref 5–17)
ANION GAP SERPL CALC-SCNC: 10 MMOL/L — SIGNIFICANT CHANGE UP (ref 5–17)
ANION GAP SERPL CALC-SCNC: 12 MMOL/L — SIGNIFICANT CHANGE UP (ref 5–17)
ANION GAP SERPL CALC-SCNC: 9 MMOL/L — SIGNIFICANT CHANGE UP (ref 5–17)
APAP SERPL-MCNC: 23 UG/ML — SIGNIFICANT CHANGE UP (ref 10–30)
APAP SERPL-MCNC: 65 UG/ML — HIGH (ref 10–30)
APTT BLD: 33.8 SEC — SIGNIFICANT CHANGE UP (ref 27.5–35.5)
APTT BLD: 38.7 SEC — HIGH (ref 27.5–35.5)
AST SERPL-CCNC: 161 U/L — HIGH (ref 10–40)
AST SERPL-CCNC: 49 U/L — HIGH (ref 10–40)
AST SERPL-CCNC: 54 U/L — HIGH (ref 10–40)
AST SERPL-CCNC: 90 U/L — HIGH (ref 10–40)
BASOPHILS # BLD AUTO: 0.03 K/UL — SIGNIFICANT CHANGE UP (ref 0–0.2)
BASOPHILS # BLD AUTO: 0.04 K/UL — SIGNIFICANT CHANGE UP (ref 0–0.2)
BASOPHILS NFR BLD AUTO: 0.2 % — SIGNIFICANT CHANGE UP (ref 0–2)
BASOPHILS NFR BLD AUTO: 0.3 % — SIGNIFICANT CHANGE UP (ref 0–2)
BILIRUB SERPL-MCNC: 0.9 MG/DL — SIGNIFICANT CHANGE UP (ref 0.2–1.2)
BILIRUB SERPL-MCNC: 1 MG/DL — SIGNIFICANT CHANGE UP (ref 0.2–1.2)
BILIRUB SERPL-MCNC: 1.2 MG/DL — SIGNIFICANT CHANGE UP (ref 0.2–1.2)
BILIRUB SERPL-MCNC: 1.7 MG/DL — HIGH (ref 0.2–1.2)
BUN SERPL-MCNC: 4 MG/DL — LOW (ref 7–23)
BUN SERPL-MCNC: 5 MG/DL — LOW (ref 7–23)
CALCIUM SERPL-MCNC: 7 MG/DL — LOW (ref 8.4–10.5)
CALCIUM SERPL-MCNC: 7.7 MG/DL — LOW (ref 8.4–10.5)
CALCIUM SERPL-MCNC: 8 MG/DL — LOW (ref 8.4–10.5)
CALCIUM SERPL-MCNC: 8.5 MG/DL — SIGNIFICANT CHANGE UP (ref 8.4–10.5)
CHLORIDE SERPL-SCNC: 102 MMOL/L — SIGNIFICANT CHANGE UP (ref 96–108)
CHLORIDE SERPL-SCNC: 106 MMOL/L — SIGNIFICANT CHANGE UP (ref 96–108)
CHLORIDE SERPL-SCNC: 97 MMOL/L — SIGNIFICANT CHANGE UP (ref 96–108)
CHLORIDE SERPL-SCNC: 99 MMOL/L — SIGNIFICANT CHANGE UP (ref 96–108)
CK SERPL-CCNC: 1377 U/L — HIGH (ref 30–200)
CK SERPL-CCNC: 1740 U/L — HIGH (ref 30–200)
CO2 SERPL-SCNC: 20 MMOL/L — LOW (ref 22–31)
CO2 SERPL-SCNC: 21 MMOL/L — LOW (ref 22–31)
CO2 SERPL-SCNC: 22 MMOL/L — SIGNIFICANT CHANGE UP (ref 22–31)
CO2 SERPL-SCNC: 25 MMOL/L — SIGNIFICANT CHANGE UP (ref 22–31)
COVID-19 SPIKE DOMAIN AB INTERP: POSITIVE
COVID-19 SPIKE DOMAIN ANTIBODY RESULT: 17 U/ML — HIGH
CREAT SERPL-MCNC: 0.56 MG/DL — SIGNIFICANT CHANGE UP (ref 0.5–1.3)
CREAT SERPL-MCNC: 0.6 MG/DL — SIGNIFICANT CHANGE UP (ref 0.5–1.3)
CREAT SERPL-MCNC: 0.68 MG/DL — SIGNIFICANT CHANGE UP (ref 0.5–1.3)
CREAT SERPL-MCNC: 0.74 MG/DL — SIGNIFICANT CHANGE UP (ref 0.5–1.3)
EOSINOPHIL # BLD AUTO: 0.02 K/UL — SIGNIFICANT CHANGE UP (ref 0–0.5)
EOSINOPHIL # BLD AUTO: 0.04 K/UL — SIGNIFICANT CHANGE UP (ref 0–0.5)
EOSINOPHIL NFR BLD AUTO: 0.2 % — SIGNIFICANT CHANGE UP (ref 0–6)
EOSINOPHIL NFR BLD AUTO: 0.3 % — SIGNIFICANT CHANGE UP (ref 0–6)
ESTIMATED AVERAGE GLUCOSE: 97 MG/DL — SIGNIFICANT CHANGE UP (ref 68–114)
GAS PNL BLDA: SIGNIFICANT CHANGE UP
GLUCOSE BLDC GLUCOMTR-MCNC: 112 MG/DL — HIGH (ref 70–99)
GLUCOSE BLDC GLUCOMTR-MCNC: 131 MG/DL — HIGH (ref 70–99)
GLUCOSE BLDC GLUCOMTR-MCNC: 160 MG/DL — HIGH (ref 70–99)
GLUCOSE BLDC GLUCOMTR-MCNC: 181 MG/DL — HIGH (ref 70–99)
GLUCOSE SERPL-MCNC: 149 MG/DL — HIGH (ref 70–99)
GLUCOSE SERPL-MCNC: 155 MG/DL — HIGH (ref 70–99)
GLUCOSE SERPL-MCNC: 191 MG/DL — HIGH (ref 70–99)
GLUCOSE SERPL-MCNC: 198 MG/DL — HIGH (ref 70–99)
HCT VFR BLD CALC: 33.9 % — LOW (ref 39–50)
HCT VFR BLD CALC: 35.3 % — LOW (ref 39–50)
HGB BLD-MCNC: 11.9 G/DL — LOW (ref 13–17)
HGB BLD-MCNC: 12.3 G/DL — LOW (ref 13–17)
IMM GRANULOCYTES NFR BLD AUTO: 0.2 % — SIGNIFICANT CHANGE UP (ref 0–1.5)
IMM GRANULOCYTES NFR BLD AUTO: 0.3 % — SIGNIFICANT CHANGE UP (ref 0–1.5)
INR BLD: 1.06 — SIGNIFICANT CHANGE UP (ref 0.88–1.16)
INR BLD: 1.17 — HIGH (ref 0.88–1.16)
LACTATE SERPL-SCNC: 1.8 MMOL/L — SIGNIFICANT CHANGE UP (ref 0.5–2)
LYMPHOCYTES # BLD AUTO: 1.41 K/UL — SIGNIFICANT CHANGE UP (ref 1–3.3)
LYMPHOCYTES # BLD AUTO: 1.48 K/UL — SIGNIFICANT CHANGE UP (ref 1–3.3)
LYMPHOCYTES # BLD AUTO: 10.7 % — LOW (ref 13–44)
LYMPHOCYTES # BLD AUTO: 12.2 % — LOW (ref 13–44)
MAGNESIUM SERPL-MCNC: 1.9 MG/DL — SIGNIFICANT CHANGE UP (ref 1.6–2.6)
MAGNESIUM SERPL-MCNC: 2.1 MG/DL — SIGNIFICANT CHANGE UP (ref 1.6–2.6)
MCHC RBC-ENTMCNC: 33 PG — SIGNIFICANT CHANGE UP (ref 27–34)
MCHC RBC-ENTMCNC: 33.4 PG — SIGNIFICANT CHANGE UP (ref 27–34)
MCHC RBC-ENTMCNC: 34.8 GM/DL — SIGNIFICANT CHANGE UP (ref 32–36)
MCHC RBC-ENTMCNC: 35.1 GM/DL — SIGNIFICANT CHANGE UP (ref 32–36)
MCV RBC AUTO: 94.6 FL — SIGNIFICANT CHANGE UP (ref 80–100)
MCV RBC AUTO: 95.2 FL — SIGNIFICANT CHANGE UP (ref 80–100)
MONOCYTES # BLD AUTO: 0.57 K/UL — SIGNIFICANT CHANGE UP (ref 0–0.9)
MONOCYTES # BLD AUTO: 0.84 K/UL — SIGNIFICANT CHANGE UP (ref 0–0.9)
MONOCYTES NFR BLD AUTO: 4.7 % — SIGNIFICANT CHANGE UP (ref 2–14)
MONOCYTES NFR BLD AUTO: 6.3 % — SIGNIFICANT CHANGE UP (ref 2–14)
NEUTROPHILS # BLD AUTO: 10.04 K/UL — HIGH (ref 1.8–7.4)
NEUTROPHILS # BLD AUTO: 10.86 K/UL — HIGH (ref 1.8–7.4)
NEUTROPHILS NFR BLD AUTO: 82.1 % — HIGH (ref 43–77)
NEUTROPHILS NFR BLD AUTO: 82.5 % — HIGH (ref 43–77)
NRBC # BLD: 0 /100 WBCS — SIGNIFICANT CHANGE UP (ref 0–0)
NRBC # BLD: 0 /100 WBCS — SIGNIFICANT CHANGE UP (ref 0–0)
PHOSPHATE SERPL-MCNC: 2.1 MG/DL — LOW (ref 2.5–4.5)
PHOSPHATE SERPL-MCNC: 2.5 MG/DL — SIGNIFICANT CHANGE UP (ref 2.5–4.5)
PLATELET # BLD AUTO: 210 K/UL — SIGNIFICANT CHANGE UP (ref 150–400)
PLATELET # BLD AUTO: 215 K/UL — SIGNIFICANT CHANGE UP (ref 150–400)
POTASSIUM SERPL-MCNC: 3 MMOL/L — LOW (ref 3.5–5.3)
POTASSIUM SERPL-MCNC: 3 MMOL/L — LOW (ref 3.5–5.3)
POTASSIUM SERPL-MCNC: 4 MMOL/L — SIGNIFICANT CHANGE UP (ref 3.5–5.3)
POTASSIUM SERPL-MCNC: 4.1 MMOL/L — SIGNIFICANT CHANGE UP (ref 3.5–5.3)
POTASSIUM SERPL-SCNC: 3 MMOL/L — LOW (ref 3.5–5.3)
POTASSIUM SERPL-SCNC: 3 MMOL/L — LOW (ref 3.5–5.3)
POTASSIUM SERPL-SCNC: 4 MMOL/L — SIGNIFICANT CHANGE UP (ref 3.5–5.3)
POTASSIUM SERPL-SCNC: 4.1 MMOL/L — SIGNIFICANT CHANGE UP (ref 3.5–5.3)
PROT SERPL-MCNC: 5.6 G/DL — LOW (ref 6–8.3)
PROT SERPL-MCNC: 6 G/DL — SIGNIFICANT CHANGE UP (ref 6–8.3)
PROT SERPL-MCNC: 6.2 G/DL — SIGNIFICANT CHANGE UP (ref 6–8.3)
PROT SERPL-MCNC: 6.4 G/DL — SIGNIFICANT CHANGE UP (ref 6–8.3)
PROTHROM AB SERPL-ACNC: 12.7 SEC — SIGNIFICANT CHANGE UP (ref 10.6–13.6)
PROTHROM AB SERPL-ACNC: 13.9 SEC — HIGH (ref 10.6–13.6)
RBC # BLD: 3.56 M/UL — LOW (ref 4.2–5.8)
RBC # BLD: 3.73 M/UL — LOW (ref 4.2–5.8)
RBC # FLD: 12.1 % — SIGNIFICANT CHANGE UP (ref 10.3–14.5)
RBC # FLD: 12.1 % — SIGNIFICANT CHANGE UP (ref 10.3–14.5)
SARS-COV-2 IGG+IGM SERPL QL IA: 17 U/ML — HIGH
SARS-COV-2 IGG+IGM SERPL QL IA: POSITIVE
SODIUM SERPL-SCNC: 131 MMOL/L — LOW (ref 135–145)
SODIUM SERPL-SCNC: 133 MMOL/L — LOW (ref 135–145)
SODIUM SERPL-SCNC: 134 MMOL/L — LOW (ref 135–145)
SODIUM SERPL-SCNC: 135 MMOL/L — SIGNIFICANT CHANGE UP (ref 135–145)
WBC # BLD: 12.17 K/UL — HIGH (ref 3.8–10.5)
WBC # BLD: 13.23 K/UL — HIGH (ref 3.8–10.5)
WBC # FLD AUTO: 12.17 K/UL — HIGH (ref 3.8–10.5)
WBC # FLD AUTO: 13.23 K/UL — HIGH (ref 3.8–10.5)

## 2021-08-17 PROCEDURE — 99291 CRITICAL CARE FIRST HOUR: CPT

## 2021-08-17 PROCEDURE — 93010 ELECTROCARDIOGRAM REPORT: CPT

## 2021-08-17 PROCEDURE — 71045 X-RAY EXAM CHEST 1 VIEW: CPT | Mod: 26

## 2021-08-17 PROCEDURE — 99254 IP/OBS CNSLTJ NEW/EST MOD 60: CPT

## 2021-08-17 RX ORDER — CALCIUM GLUCONATE 100 MG/ML
1 VIAL (ML) INTRAVENOUS ONCE
Refills: 0 | Status: COMPLETED | OUTPATIENT
Start: 2021-08-17 | End: 2021-08-17

## 2021-08-17 RX ORDER — PROPOFOL 10 MG/ML
10 INJECTION, EMULSION INTRAVENOUS
Qty: 1000 | Refills: 0 | Status: DISCONTINUED | OUTPATIENT
Start: 2021-08-17 | End: 2021-08-18

## 2021-08-17 RX ORDER — POTASSIUM CHLORIDE 20 MEQ
10 PACKET (EA) ORAL
Refills: 0 | Status: COMPLETED | OUTPATIENT
Start: 2021-08-17 | End: 2021-08-17

## 2021-08-17 RX ORDER — PROPOFOL 10 MG/ML
5 INJECTION, EMULSION INTRAVENOUS
Qty: 1000 | Refills: 0 | Status: DISCONTINUED | OUTPATIENT
Start: 2021-08-17 | End: 2021-08-17

## 2021-08-17 RX ORDER — POTASSIUM PHOSPHATE, MONOBASIC POTASSIUM PHOSPHATE, DIBASIC 236; 224 MG/ML; MG/ML
30 INJECTION, SOLUTION INTRAVENOUS ONCE
Refills: 0 | Status: COMPLETED | OUTPATIENT
Start: 2021-08-17 | End: 2021-08-17

## 2021-08-17 RX ORDER — POTASSIUM CHLORIDE 20 MEQ
40 PACKET (EA) ORAL ONCE
Refills: 0 | Status: COMPLETED | OUTPATIENT
Start: 2021-08-17 | End: 2021-08-17

## 2021-08-17 RX ORDER — PROPOFOL 10 MG/ML
10 INJECTION, EMULSION INTRAVENOUS
Qty: 1000 | Refills: 0 | Status: DISCONTINUED | OUTPATIENT
Start: 2021-08-17 | End: 2021-08-17

## 2021-08-17 RX ORDER — SODIUM CHLORIDE 9 MG/ML
1000 INJECTION, SOLUTION INTRAVENOUS
Refills: 0 | Status: DISCONTINUED | OUTPATIENT
Start: 2021-08-17 | End: 2021-08-19

## 2021-08-17 RX ORDER — DEXMEDETOMIDINE HYDROCHLORIDE IN 0.9% SODIUM CHLORIDE 4 UG/ML
0.2 INJECTION INTRAVENOUS
Qty: 400 | Refills: 0 | Status: DISCONTINUED | OUTPATIENT
Start: 2021-08-17 | End: 2021-08-19

## 2021-08-17 RX ORDER — POTASSIUM CHLORIDE 20 MEQ
10 PACKET (EA) ORAL
Refills: 0 | Status: DISCONTINUED | OUTPATIENT
Start: 2021-08-17 | End: 2021-08-17

## 2021-08-17 RX ADMIN — POTASSIUM PHOSPHATE, MONOBASIC POTASSIUM PHOSPHATE, DIBASIC 83.33 MILLIMOLE(S): 236; 224 INJECTION, SOLUTION INTRAVENOUS at 04:35

## 2021-08-17 RX ADMIN — DEXMEDETOMIDINE HYDROCHLORIDE IN 0.9% SODIUM CHLORIDE 2.95 MICROGRAM(S)/KG/HR: 4 INJECTION INTRAVENOUS at 18:13

## 2021-08-17 RX ADMIN — PROPOFOL 1.77 MICROGRAM(S)/KG/MIN: 10 INJECTION, EMULSION INTRAVENOUS at 08:30

## 2021-08-17 RX ADMIN — Medication 40 MILLIEQUIVALENT(S): at 02:29

## 2021-08-17 RX ADMIN — Medication 100 MILLIEQUIVALENT(S): at 00:52

## 2021-08-17 RX ADMIN — Medication 2: at 17:23

## 2021-08-17 RX ADMIN — Medication 40 MILLIEQUIVALENT(S): at 14:23

## 2021-08-17 RX ADMIN — Medication 100 MILLIEQUIVALENT(S): at 03:34

## 2021-08-17 RX ADMIN — Medication 50 MILLIEQUIVALENT(S): at 18:24

## 2021-08-17 RX ADMIN — Medication 50 MILLIEQUIVALENT(S): at 17:28

## 2021-08-17 RX ADMIN — DEXMEDETOMIDINE HYDROCHLORIDE IN 0.9% SODIUM CHLORIDE 2.95 MICROGRAM(S)/KG/HR: 4 INJECTION INTRAVENOUS at 22:43

## 2021-08-17 RX ADMIN — Medication 64.38 GRAM(S): at 02:27

## 2021-08-17 RX ADMIN — Medication 2: at 12:50

## 2021-08-17 RX ADMIN — ENOXAPARIN SODIUM 40 MILLIGRAM(S): 100 INJECTION SUBCUTANEOUS at 22:00

## 2021-08-17 RX ADMIN — PANTOPRAZOLE SODIUM 40 MILLIGRAM(S): 20 TABLET, DELAYED RELEASE ORAL at 21:58

## 2021-08-17 RX ADMIN — PROPOFOL 3.54 MICROGRAM(S)/KG/MIN: 10 INJECTION, EMULSION INTRAVENOUS at 19:13

## 2021-08-17 RX ADMIN — SODIUM CHLORIDE 150 MILLILITER(S): 9 INJECTION, SOLUTION INTRAVENOUS at 02:36

## 2021-08-17 RX ADMIN — CHLORHEXIDINE GLUCONATE 15 MILLILITER(S): 213 SOLUTION TOPICAL at 18:13

## 2021-08-17 RX ADMIN — CHLORHEXIDINE GLUCONATE 15 MILLILITER(S): 213 SOLUTION TOPICAL at 05:10

## 2021-08-17 RX ADMIN — Medication 100 GRAM(S): at 03:33

## 2021-08-17 RX ADMIN — Medication 100 MILLIEQUIVALENT(S): at 02:31

## 2021-08-17 RX ADMIN — Medication 50 MILLIEQUIVALENT(S): at 19:16

## 2021-08-17 RX ADMIN — DEXMEDETOMIDINE HYDROCHLORIDE IN 0.9% SODIUM CHLORIDE 2.95 MICROGRAM(S)/KG/HR: 4 INJECTION INTRAVENOUS at 08:35

## 2021-08-17 NOTE — CONSULT NOTE ADULT - ASSESSMENT
25M BIB EMS d/t AMS from intentional OD of Tylenol, Alcohol, Unisom (doxylamine), & Benadryl of unknown quantity. Found to have mild transaminitis with elevated serum acetaminophen, alcohol & positive urine amphetamines. Admitted to MICU for airway protection, NAC infusion, monitoring of mental status & liver function. Poison control following. GI consulted for Tylenol overdose.    #Acetaminophen overdose  - Likely presented within 24h of OD, although do not know exact timing  - Presented to [facility] ~ 1720 8/16  - Tylenol lvl >300 @ 1800 8/16  - Initial AST 99, ALT 48, Alk Phos 60, Bili 0.9, INR ~1 -> most recent AST/ ALT downtrending to 49/25  - S/p activated charcoal  - On 20h NAC protocol (started 2032 8/16)     --9g IV over first 60min     --3g IV over 4 hrs     --6g IV over 16hrs  - Monitor LTFs and Tylenol level per poison control

## 2021-08-17 NOTE — PROGRESS NOTE ADULT - SUBJECTIVE AND OBJECTIVE BOX
**incomplete**    Patient is a 25y old  Male who presents with a chief complaint of AMS 2/2 reported drug OD (17 Aug 2021 08:11)      INTERVAL HPI/OVERNIGHT EVENTS:   No overnight events   Afebrile, hemodynamically stable     Subjective: Patient seen and examined at bedside.    ICU Vital Signs Last 24 Hrs  T(C): 37.9 (17 Aug 2021 13:00), Max: 37.9 (17 Aug 2021 13:00)  T(F): 100.2 (17 Aug 2021 13:00), Max: 100.2 (17 Aug 2021 13:00)  HR: 65 (17 Aug 2021 13:00) (64 - 159)  BP: 108/70 (17 Aug 2021 13:00) (102/63 - 161/97)  BP(mean): 84 (17 Aug 2021 13:00) (79 - 106)  ABP: --  ABP(mean): --  RR: 20 (17 Aug 2021 13:00) (12 - 24)  SpO2: 99% (17 Aug 2021 13:00) (96% - 100%)    I&O's Summary    16 Aug 2021 07:01  -  17 Aug 2021 07:00  --------------------------------------------------------  IN: 2047.9 mL / OUT: 1740 mL / NET: 307.9 mL    17 Aug 2021 07:01  -  17 Aug 2021 13:35  --------------------------------------------------------  IN: 1091.9 mL / OUT: 350 mL / NET: 741.9 mL      Mode: AC/ CMV (Assist Control/ Continuous Mandatory Ventilation)  RR (machine): 18  TV (machine): 400  FiO2: 30  PEEP: 5  ITime: 1  MAP: 7.6  PIP: 13      PHYSICAL EXAM:  GENERAL: No acute distress   HEAD:  Atraumatic, Normocephalic  EYES: EOMI, PERRLA, conjunctiva and sclera clear  ENMT: No tonsillar erythema, exudates, or enlargement; Moist mucous membranes  NECK: Supple, No JVD, Normal thyroid  HEART: Regular rate and rhythm; No murmurs, rubs, or gallops  RESPIRATORY: CTA B/L, No W/R/R  ABDOMEN: Soft, Nontender, Nondistended; Bowel sounds present  NEUROLOGY: A&Ox3, nonfocal, moving all extremities  EXTREMITIES:  2+ Peripheral Pulses, No clubbing, cyanosis, or edema  SKIN: warm, dry, normal color, no rash or abnormal lesions    LABS:                        11.9   13.23 )-----------( 210      ( 17 Aug 2021 04:48 )             33.9     08-17    133<L>  |  102  |  4<L>  ----------------------------<  191<H>  3.0<L>   |  21<L>  |  0.68    Ca    7.7<L>      17 Aug 2021 12:30  Phos  2.5     08-17  Mg     1.9     08-17    TPro  5.6<L>  /  Alb  3.5  /  TBili  1.2  /  DBili  x   /  AST  90<H>  /  ALT  33  /  AlkPhos  39<L>  08-17    PT/INR - ( 17 Aug 2021 00:49 )   PT: 12.7 sec;   INR: 1.06          PTT - ( 17 Aug 2021 04:48 )  PTT:38.7 sec  Urinalysis Basic - ( 16 Aug 2021 17:39 )    Color: Yellow / Appearance: Clear / SG: >=1.030 / pH: x  Gluc: x / Ketone: 15 mg/dL  / Bili: NEGATIVE / Urobili: 0.2 E.U./dL   Blood: x / Protein: 100 mg/dL / Nitrite: NEGATIVE   Leuk Esterase: NEGATIVE / RBC: < 5 /HPF / WBC < 5 /HPF   Sq Epi: x / Non Sq Epi: 5-10 /HPF / Bacteria: Present /HPF      CAPILLARY BLOOD GLUCOSE      POCT Blood Glucose.: 181 mg/dL (17 Aug 2021 11:51)  POCT Blood Glucose.: 131 mg/dL (17 Aug 2021 06:04)  POCT Blood Glucose.: 275 mg/dL (16 Aug 2021 22:50)  POCT Blood Glucose.: 155 mg/dL (16 Aug 2021 17:12)    ABG - ( 17 Aug 2021 02:23 )  pH, Arterial: 7.51  pH, Blood: x     /  pCO2: 33    /  pO2: 169   / HCO3: 26    / Base Excess: 3.6   /  SaO2: 98.2                Consultant(s) Notes Reviewed:  [x ] YES  [ ] NO    MEDICATIONS  (STANDING):  chlorhexidine 0.12% Liquid 15 milliLiter(s) Oral Mucosa every 12 hours  chlorhexidine 4% Liquid 1 Application(s) Topical <User Schedule>  dexMEDEtomidine Infusion 0.2 MICROgram(s)/kG/Hr (2.95 mL/Hr) IV Continuous <Continuous>  dextrose 40% Gel 15 Gram(s) Oral once  dextrose 5%. 1000 milliLiter(s) (50 mL/Hr) IV Continuous <Continuous>  dextrose 5%. 1000 milliLiter(s) (100 mL/Hr) IV Continuous <Continuous>  dextrose 50% Injectable 25 Gram(s) IV Push once  dextrose 50% Injectable 12.5 Gram(s) IV Push once  dextrose 50% Injectable 25 Gram(s) IV Push once  enoxaparin Injectable 40 milliGRAM(s) SubCutaneous every 24 hours  glucagon  Injectable 1 milliGRAM(s) IntraMuscular once  insulin lispro (ADMELOG) corrective regimen sliding scale   SubCutaneous every 6 hours  lactated ringers. 1000 milliLiter(s) (150 mL/Hr) IV Continuous <Continuous>  pantoprazole  Injectable 40 milliGRAM(s) IV Push every 24 hours  propofol Infusion 5 MICROgram(s)/kG/Min (1.77 mL/Hr) IV Continuous <Continuous>    MEDICATIONS  (PRN):      Care Discussed with Consultants/Other Providers [ x] YES  [ ] NO    RADIOLOGY & ADDITIONAL TESTS: Patient is a 25y old  Male who presents with a chief complaint of AMS 2/2 reported drug OD (17 Aug 2021 08:11)      INTERVAL HPI/OVERNIGHT EVENTS:   No overnight events   Afebrile, hemodynamically stable     Subjective: Patient intubated and sedated.    ICU Vital Signs Last 24 Hrs  T(C): 37.9 (17 Aug 2021 13:00), Max: 37.9 (17 Aug 2021 13:00)  T(F): 100.2 (17 Aug 2021 13:00), Max: 100.2 (17 Aug 2021 13:00)  HR: 65 (17 Aug 2021 13:00) (64 - 159)  BP: 108/70 (17 Aug 2021 13:00) (102/63 - 161/97)  BP(mean): 84 (17 Aug 2021 13:00) (79 - 106)  ABP: --  ABP(mean): --  RR: 20 (17 Aug 2021 13:00) (12 - 24)  SpO2: 99% (17 Aug 2021 13:00) (96% - 100%)    I&O's Summary    16 Aug 2021 07:01  -  17 Aug 2021 07:00  --------------------------------------------------------  IN: 2047.9 mL / OUT: 1740 mL / NET: 307.9 mL    17 Aug 2021 07:01  -  17 Aug 2021 13:35  --------------------------------------------------------  IN: 1091.9 mL / OUT: 350 mL / NET: 741.9 mL      Mode: AC/ CMV (Assist Control/ Continuous Mandatory Ventilation)  RR (machine): 18  TV (machine): 400  FiO2: 30  PEEP: 5  ITime: 1  MAP: 7.6  PIP: 13    PHYSICAL EXAM:  GENERAL: No acute distress, intubated and sedated  EYES: PERRLA, conjunctiva and sclera clear  NECK: Supple, No JVD, Normal thyroid  HEART: Regular rate and rhythm; No murmurs, rubs, or gallops  RESPIRATORY: CTA B/L, No W/R/R  ABDOMEN: Soft, Nontender, Nondistended; Bowel sounds present  NEUROLOGY: A&Ox3, nonfocal, moving all extremities  EXTREMITIES:  2+ Peripheral Pulses, No clubbing, cyanosis, or edema  SKIN: warm, dry, normal color, no rash or abnormal lesions    LABS:                        11.9   13.23 )-----------( 210      ( 17 Aug 2021 04:48 )             33.9     08-17    133<L>  |  102  |  4<L>  ----------------------------<  191<H>  3.0<L>   |  21<L>  |  0.68    Ca    7.7<L>      17 Aug 2021 12:30  Phos  2.5     08-17  Mg     1.9     08-17    TPro  5.6<L>  /  Alb  3.5  /  TBili  1.2  /  DBili  x   /  AST  90<H>  /  ALT  33  /  AlkPhos  39<L>  08-17    PT/INR - ( 17 Aug 2021 00:49 )   PT: 12.7 sec;   INR: 1.06          PTT - ( 17 Aug 2021 04:48 )  PTT:38.7 sec  Urinalysis Basic - ( 16 Aug 2021 17:39 )    Color: Yellow / Appearance: Clear / SG: >=1.030 / pH: x  Gluc: x / Ketone: 15 mg/dL  / Bili: NEGATIVE / Urobili: 0.2 E.U./dL   Blood: x / Protein: 100 mg/dL / Nitrite: NEGATIVE   Leuk Esterase: NEGATIVE / RBC: < 5 /HPF / WBC < 5 /HPF   Sq Epi: x / Non Sq Epi: 5-10 /HPF / Bacteria: Present /HPF      CAPILLARY BLOOD GLUCOSE      POCT Blood Glucose.: 181 mg/dL (17 Aug 2021 11:51)  POCT Blood Glucose.: 131 mg/dL (17 Aug 2021 06:04)  POCT Blood Glucose.: 275 mg/dL (16 Aug 2021 22:50)  POCT Blood Glucose.: 155 mg/dL (16 Aug 2021 17:12)    ABG - ( 17 Aug 2021 02:23 )  pH, Arterial: 7.51  pH, Blood: x     /  pCO2: 33    /  pO2: 169   / HCO3: 26    / Base Excess: 3.6   /  SaO2: 98.2                Consultant(s) Notes Reviewed:  [x ] YES  [ ] NO    MEDICATIONS  (STANDING):  chlorhexidine 0.12% Liquid 15 milliLiter(s) Oral Mucosa every 12 hours  chlorhexidine 4% Liquid 1 Application(s) Topical <User Schedule>  dexMEDEtomidine Infusion 0.2 MICROgram(s)/kG/Hr (2.95 mL/Hr) IV Continuous <Continuous>  dextrose 40% Gel 15 Gram(s) Oral once  dextrose 5%. 1000 milliLiter(s) (50 mL/Hr) IV Continuous <Continuous>  dextrose 5%. 1000 milliLiter(s) (100 mL/Hr) IV Continuous <Continuous>  dextrose 50% Injectable 25 Gram(s) IV Push once  dextrose 50% Injectable 12.5 Gram(s) IV Push once  dextrose 50% Injectable 25 Gram(s) IV Push once  enoxaparin Injectable 40 milliGRAM(s) SubCutaneous every 24 hours  glucagon  Injectable 1 milliGRAM(s) IntraMuscular once  insulin lispro (ADMELOG) corrective regimen sliding scale   SubCutaneous every 6 hours  lactated ringers. 1000 milliLiter(s) (150 mL/Hr) IV Continuous <Continuous>  pantoprazole  Injectable 40 milliGRAM(s) IV Push every 24 hours  propofol Infusion 5 MICROgram(s)/kG/Min (1.77 mL/Hr) IV Continuous <Continuous>    MEDICATIONS  (PRN):      Care Discussed with Consultants/Other Providers [ x] YES  [ ] NO    RADIOLOGY & ADDITIONAL TESTS:

## 2021-08-17 NOTE — CONSULT NOTE ADULT - SUBJECTIVE AND OBJECTIVE BOX
GASTROENTEROLOGY CONSULT NOTE    HPI: 25M with unknown pmh BIB EMS d/t AMS and transferred from [facility] after overdose of Tylenol + alcohol, benadryl and unisom. Pt called EMS himself, reported heavy alcohol use f/b intentional overdose of Unisom (doxylamine) Tylenol, & Benadryl. EMS noted pt having blue residue over mouth and nose, denies witnessed vomiting. EMS reports he was alert on their arrival but lost mental status en route. No other medications found on scene. In the ED he was noted to be tachycardic and increasingly somnolent. He was then intubated for airway protection. Poison control was called by the ED and he was stared on bicarb drip, given activated charcoal and versed. On arrival the patient was still intubated and on versed drip. He was tachycardic to 120s and hypothermic. His imaging (CXR and CT head were unremarkable.) labs were significant for severe hypokalemia, hypomagnesemia, elevated CPK and elevated Tylenol level(>300). ETOH level 82. EKG showed widening QRS.    No previous admissions on record. No collateral available.    Since admission he has been on NAC protocol with q6 LFTs and poison control following.    In the MICU,  VS: T 94.5F 34.7C, , /86(106), Vent AC/VC 40/400/18/5 sat 98%   Labs: Lactate 10 > 7.1 > 4.7, Mg 1.0, K2.8, AG 26, Prot 8.7, AST/ALT 99/48, Trop <0.017. ABG 7.38/36/14, Acetaminophen >300, Salicylate 0.5, Alcohol 82.  (CBC w diff, PT/INR, PTT WNL)  Urine: Ketones 15, Prot 100, SG >1.030, Bacteria present, Hyaline casts few. Amphetamine positive  EKG: Sinus tach, Biatrial enlargement, LVH w repolarization abnormality, Cannot r/o inferior infarct age indeterminate  CXR: No acute cardiopulmonary disease process  CT Head: No acute intracranial hemorrhage or transcortical infarction  Orders: Etomidate 18mg IVP, Rocuronium 72mg IVP, NS 0.9% 2L, Charcoal 50mg PO, NaHCO3 100mEq IVP, Midazolam 0.12 mg/kg/hr, NaHCO3 0.381 mEq/kg/hr, KCl 20mEq IV, Mg 2g IVPB, NaHCO3 infusion 150mEq, Acetylcysteine 9g IVPB, Midazolam infusion 100mg, Protonix 40mg IVP   (16 Aug 2021 21:05)    Allergies    Allergy Status Unknown    Intolerances      Home Medications:    MEDICATIONS:  MEDICATIONS  (STANDING):  chlorhexidine 0.12% Liquid 15 milliLiter(s) Oral Mucosa every 12 hours  chlorhexidine 4% Liquid 1 Application(s) Topical <User Schedule>  dextrose 40% Gel 15 Gram(s) Oral once  dextrose 5%. 1000 milliLiter(s) (50 mL/Hr) IV Continuous <Continuous>  dextrose 5%. 1000 milliLiter(s) (100 mL/Hr) IV Continuous <Continuous>  dextrose 50% Injectable 25 Gram(s) IV Push once  dextrose 50% Injectable 12.5 Gram(s) IV Push once  dextrose 50% Injectable 25 Gram(s) IV Push once  enoxaparin Injectable 40 milliGRAM(s) SubCutaneous every 24 hours  glucagon  Injectable 1 milliGRAM(s) IntraMuscular once  insulin lispro (ADMELOG) corrective regimen sliding scale   SubCutaneous Before meals and at bedtime  lactated ringers. 1000 milliLiter(s) (150 mL/Hr) IV Continuous <Continuous>  midazolam Infusion 0.02 mG/kG/Hr (1.18 mL/Hr) IV Continuous <Continuous>  pantoprazole  Injectable 40 milliGRAM(s) IV Push every 24 hours    MEDICATIONS  (PRN):    PAST MEDICAL & SURGICAL HISTORY:    FAMILY HISTORY:    SOCIAL HISTORY:  Tobacco: [ ] Current, [ ] Former, [ ] Never; Pack Years:  Alcohol:  Illicit Drugs:    REVIEW OF SYSTEMS:  unable to obtain    Vital Signs Last 24 Hrs  T(C): 37.1 (17 Aug 2021 05:57), Max: 37.3 (16 Aug 2021 17:26)  T(F): 98.7 (17 Aug 2021 05:57), Max: 99.1 (16 Aug 2021 17:26)  HR: 76 (17 Aug 2021 07:00) (76 - 159)  BP: 110/72 (17 Aug 2021 07:00) (102/63 - 161/97)  BP(mean): 86 (17 Aug 2021 07:00) (79 - 106)  RR: 18 (17 Aug 2021 07:00) (12 - 24)  SpO2: 98% (17 Aug 2021 07:00) (96% - 100%)    08-16 @ 07:01  -  08-17 @ 07:00  --------------------------------------------------------  IN: 2047.9 mL / OUT: 1740 mL / NET: 307.9 mL      Mode: AC/ CMV (Assist Control/ Continuous Mandatory Ventilation)  RR (machine): 18  TV (machine): 400  FiO2: 30  PEEP: 5  ITime: 1  MAP: 8.7  PIP: 15    PHYSICAL EXAM:    General: intubated and sedated in soft restraints  Eyes: Anicteric sclerae, moist conjunctivae  HENT: Moist mucous membranes  Neck: Trachea midline, supple  Lungs: intubated  Cardiovascular: RRR  Abdomen: Soft, non-tender non-distended; No rebound or guarding  Extremities: No clubbing, cyanosis or edema  Neurological: sedated  Skin: Warm and dry. No obvious rash    LABS:                        11.9   13.23 )-----------( 210      ( 17 Aug 2021 04:48 )             33.9     08-17    131<L>  |  99  |  4<L>  ----------------------------<  155<H>  4.0   |  22  |  0.60    Ca    8.0<L>      17 Aug 2021 04:47  Phos  2.5     08-17  Mg     1.9     08-17    TPro  6.0  /  Alb  3.8  /  TBili  1.0  /  DBili  x   /  AST  49<H>  /  ALT  25  /  AlkPhos  37<L>  08-17        PT/INR - ( 17 Aug 2021 00:49 )   PT: 12.7 sec;   INR: 1.06          PTT - ( 17 Aug 2021 04:48 )  PTT:38.7 sec    RADIOLOGY & ADDITIONAL STUDIES:     Reviewed

## 2021-08-17 NOTE — CONSULT NOTE ADULT - ATTENDING COMMENTS
#Acetaminophen overdose  - Likely presented within 24h of OD, although do not know exact timing  - [facility]  - Tylenol lvl >300 @ 1800 8/16  - Initial AST 99, ALT 48, Alk Phos 60, Bili 0.9, INR ~1 -> most recent AST/ ALT downtrending to 49/25  - S/p activated charcoal  - On 20h NAC protocol (started 2032 8/16)     --9g IV over first 60min     --3g IV over 4 hrs     --6g IV over 16hrs  - Monitor LTFs and Tylenol level per poison

## 2021-08-17 NOTE — PROGRESS NOTE ADULT - ATTENDING COMMENTS
Intentional overdose with Tylenol, Benadryl, Doxylamine and EtOH with acute hypoxemic respiratory failure and rhabdo. Has transaminitis.  Had QTC prolongation now resolved. NAC gtt. BMP, tylenol level, CK Q4 hours.  EKG Q6 hours. Pressure support trials daily.

## 2021-08-17 NOTE — PROGRESS NOTE ADULT - ASSESSMENT
· Assessment	  25M BIB EMS d/t AMS from intentional alcohol & drug OD of Unisom (doxylamine) Tylenol, & Benadryl of unknown quantity. Found to have mild transaminitis with elevated serum acetaminophen, alcohol & positive urine amphetamines. Admitted to MICU for airway protection, NAC infusion, monitoring of mental status & liver function.    NEURO  # Metabolic encephalopathy likely 2/2 hepatotoxicity & electrolyte abnormalities  Hx of acetaminophen, anticholinergic & alcohol OD. Pt was alert on EMS arrival but lost mental status en route to [facility], AAOx0.  - Intubated & Sedated for airway protection  - Vent VC/AC 40/400/18/5, keep SpO2 >90%  - transition sedation from versed to propofol and precedex, CPAP trial, potential extubation today  - NGT placed, position confirmed by radiology  - Underlying conditions treated (see below)  - Electrolytes repleted  - Received PO Charcoal & NAC  - If patient shows signs of alcohol withdrawal manage with phenobarb on SIWA protocol      PSYCH  # Intentional Alcohol & Drug OD  - consult psych once mental status regained  - consider 1:1 if SI    CARDIO:   -Repeat EKG's Q6     PULM  # Acute hypoxemic respiratory failure  - Intubated & Sedated for airway protection  - Vent VC/AC 40/400/18/5, keep SpO2 >90%  - transition sedation from versed to propofol and Precedex CPAP trial, potential extubation today  - NGT placed, position confirmed by radiology    GI  # Hepatotoxicity 2/2 Acetaminophen OD  Hx of Acetaminophen OD of unknown quantity. At [facility] ED received charcoal 2x50mg PO  - Started on NAC infusion 9g over 16hrs @ 21:04 on 8/16.  - AST 99 > 78 > 54  - ALT 48 > 32 > 27  - Trend LFTs q6h  - PT/INT, PTT WNL  - Case reported to [place] poison control #[phone]  - They w contact us for f/u on 8/17 around 6PM  - f/u GI recs (spoke w Dr. Johnson, GI fellow w f/u in AM)  - f/u poison control recs (left message for Lyn Pizarro #[id number])    ENDO  - on ISS    RENAL  # Hypomagnesemia  1.0 > 1.8 > 2.1  - Repleted    # Hypokalemia  2.7 > 3.1 > 3.0  - Received in total 10mEq x5 IV & 40mEq via NGT  - 1g calcium gluconate IVP for cardiac protection.    - Repleted    HEMEONC: no active issues    ID: no active issues    MSK  # Anticholinergic poisoning  Hx of Benadryl OD  - hypothermic presentation disagrees with sx of anticholinergic syndrome  - Physostigmine not started  - Calcium gluconate given for cardiac protection    # c/f Rhabdomyolysis  - CK 2534 ---> 1377  - trend CK  - On LR @150mL/hr    F: None   E: Replete as necessary K>4 Mg>2  N: NPO  DVT Prophylaxis: Lovenox 40mg SQ q24h  GI prophylaxis: Protonix 40mg IVP q24h   CODE STATUS: FULL

## 2021-08-18 LAB
ALBUMIN SERPL ELPH-MCNC: 3.5 G/DL — SIGNIFICANT CHANGE UP (ref 3.3–5)
ALBUMIN SERPL ELPH-MCNC: 3.8 G/DL — SIGNIFICANT CHANGE UP (ref 3.3–5)
ALP SERPL-CCNC: 51 U/L — SIGNIFICANT CHANGE UP (ref 40–120)
ALP SERPL-CCNC: 52 U/L — SIGNIFICANT CHANGE UP (ref 40–120)
ALT FLD-CCNC: 124 U/L — HIGH (ref 10–45)
ALT FLD-CCNC: 166 U/L — HIGH (ref 10–45)
ANION GAP SERPL CALC-SCNC: 10 MMOL/L — SIGNIFICANT CHANGE UP (ref 5–17)
ANION GAP SERPL CALC-SCNC: 10 MMOL/L — SIGNIFICANT CHANGE UP (ref 5–17)
APAP SERPL-MCNC: 11 UG/ML — SIGNIFICANT CHANGE UP (ref 10–30)
APAP SERPL-MCNC: 8 UG/ML — LOW (ref 10–30)
APTT BLD: 34.5 SEC — SIGNIFICANT CHANGE UP (ref 27.5–35.5)
APTT BLD: 38.2 SEC — HIGH (ref 27.5–35.5)
AST SERPL-CCNC: 301 U/L — HIGH (ref 10–40)
AST SERPL-CCNC: 496 U/L — HIGH (ref 10–40)
BILIRUB SERPL-MCNC: 1.4 MG/DL — HIGH (ref 0.2–1.2)
BILIRUB SERPL-MCNC: 1.9 MG/DL — HIGH (ref 0.2–1.2)
BUN SERPL-MCNC: 4 MG/DL — LOW (ref 7–23)
BUN SERPL-MCNC: 5 MG/DL — LOW (ref 7–23)
CALCIUM SERPL-MCNC: 8.3 MG/DL — LOW (ref 8.4–10.5)
CALCIUM SERPL-MCNC: 9 MG/DL — SIGNIFICANT CHANGE UP (ref 8.4–10.5)
CHLORIDE SERPL-SCNC: 108 MMOL/L — SIGNIFICANT CHANGE UP (ref 96–108)
CHLORIDE SERPL-SCNC: 109 MMOL/L — HIGH (ref 96–108)
CK SERPL-CCNC: 1044 U/L — HIGH (ref 30–200)
CO2 SERPL-SCNC: 20 MMOL/L — LOW (ref 22–31)
CO2 SERPL-SCNC: 21 MMOL/L — LOW (ref 22–31)
CREAT SERPL-MCNC: 0.74 MG/DL — SIGNIFICANT CHANGE UP (ref 0.5–1.3)
CREAT SERPL-MCNC: 0.81 MG/DL — SIGNIFICANT CHANGE UP (ref 0.5–1.3)
GLUCOSE BLDC GLUCOMTR-MCNC: 111 MG/DL — HIGH (ref 70–99)
GLUCOSE BLDC GLUCOMTR-MCNC: 119 MG/DL — HIGH (ref 70–99)
GLUCOSE BLDC GLUCOMTR-MCNC: 75 MG/DL — SIGNIFICANT CHANGE UP (ref 70–99)
GLUCOSE BLDC GLUCOMTR-MCNC: 93 MG/DL — SIGNIFICANT CHANGE UP (ref 70–99)
GLUCOSE SERPL-MCNC: 106 MG/DL — HIGH (ref 70–99)
GLUCOSE SERPL-MCNC: 126 MG/DL — HIGH (ref 70–99)
HCT VFR BLD CALC: 39.9 % — SIGNIFICANT CHANGE UP (ref 39–50)
HGB BLD-MCNC: 13.5 G/DL — SIGNIFICANT CHANGE UP (ref 13–17)
INR BLD: 1.15 — SIGNIFICANT CHANGE UP (ref 0.88–1.16)
INR BLD: 1.24 — HIGH (ref 0.88–1.16)
MAGNESIUM SERPL-MCNC: 1.7 MG/DL — SIGNIFICANT CHANGE UP (ref 1.6–2.6)
MCHC RBC-ENTMCNC: 33.1 PG — SIGNIFICANT CHANGE UP (ref 27–34)
MCHC RBC-ENTMCNC: 33.8 GM/DL — SIGNIFICANT CHANGE UP (ref 32–36)
MCV RBC AUTO: 97.8 FL — SIGNIFICANT CHANGE UP (ref 80–100)
NRBC # BLD: 0 /100 WBCS — SIGNIFICANT CHANGE UP (ref 0–0)
PHOSPHATE SERPL-MCNC: 2.8 MG/DL — SIGNIFICANT CHANGE UP (ref 2.5–4.5)
PLATELET # BLD AUTO: 206 K/UL — SIGNIFICANT CHANGE UP (ref 150–400)
POTASSIUM SERPL-MCNC: 3.9 MMOL/L — SIGNIFICANT CHANGE UP (ref 3.5–5.3)
POTASSIUM SERPL-MCNC: 4.1 MMOL/L — SIGNIFICANT CHANGE UP (ref 3.5–5.3)
POTASSIUM SERPL-SCNC: 3.9 MMOL/L — SIGNIFICANT CHANGE UP (ref 3.5–5.3)
POTASSIUM SERPL-SCNC: 4.1 MMOL/L — SIGNIFICANT CHANGE UP (ref 3.5–5.3)
PROT SERPL-MCNC: 6 G/DL — SIGNIFICANT CHANGE UP (ref 6–8.3)
PROT SERPL-MCNC: 6.2 G/DL — SIGNIFICANT CHANGE UP (ref 6–8.3)
PROTHROM AB SERPL-ACNC: 13.7 SEC — HIGH (ref 10.6–13.6)
PROTHROM AB SERPL-ACNC: 14.7 SEC — HIGH (ref 10.6–13.6)
RBC # BLD: 4.08 M/UL — LOW (ref 4.2–5.8)
RBC # FLD: 12.3 % — SIGNIFICANT CHANGE UP (ref 10.3–14.5)
SODIUM SERPL-SCNC: 139 MMOL/L — SIGNIFICANT CHANGE UP (ref 135–145)
SODIUM SERPL-SCNC: 139 MMOL/L — SIGNIFICANT CHANGE UP (ref 135–145)
WBC # BLD: 12.84 K/UL — HIGH (ref 3.8–10.5)
WBC # FLD AUTO: 12.84 K/UL — HIGH (ref 3.8–10.5)

## 2021-08-18 PROCEDURE — 99233 SBSQ HOSP IP/OBS HIGH 50: CPT | Mod: GC

## 2021-08-18 PROCEDURE — 99223 1ST HOSP IP/OBS HIGH 75: CPT

## 2021-08-18 PROCEDURE — 99232 SBSQ HOSP IP/OBS MODERATE 35: CPT

## 2021-08-18 RX ORDER — QUETIAPINE FUMARATE 200 MG/1
50 TABLET, FILM COATED ORAL EVERY 6 HOURS
Refills: 0 | Status: DISCONTINUED | OUTPATIENT
Start: 2021-08-18 | End: 2021-08-23

## 2021-08-18 RX ORDER — BENZOCAINE AND MENTHOL 5; 1 G/100ML; G/100ML
1 LIQUID ORAL
Refills: 0 | Status: DISCONTINUED | OUTPATIENT
Start: 2021-08-18 | End: 2021-08-23

## 2021-08-18 RX ORDER — ACETYLCYSTEINE 200 MG/ML
6 VIAL (ML) MISCELLANEOUS ONCE
Refills: 0 | Status: DISCONTINUED | OUTPATIENT
Start: 2021-08-18 | End: 2021-08-18

## 2021-08-18 RX ORDER — ACETYLCYSTEINE 200 MG/ML
9 VIAL (ML) MISCELLANEOUS ONCE
Refills: 0 | Status: COMPLETED | OUTPATIENT
Start: 2021-08-18 | End: 2021-08-18

## 2021-08-18 RX ORDER — QUETIAPINE FUMARATE 200 MG/1
50 TABLET, FILM COATED ORAL EVERY 12 HOURS
Refills: 0 | Status: DISCONTINUED | OUTPATIENT
Start: 2021-08-18 | End: 2021-08-19

## 2021-08-18 RX ORDER — ACETYLCYSTEINE 200 MG/ML
3 VIAL (ML) MISCELLANEOUS ONCE
Refills: 0 | Status: DISCONTINUED | OUTPATIENT
Start: 2021-08-18 | End: 2021-08-18

## 2021-08-18 RX ORDER — BENZOCAINE AND MENTHOL 5; 1 G/100ML; G/100ML
1 LIQUID ORAL
Refills: 0 | Status: DISCONTINUED | OUTPATIENT
Start: 2021-08-18 | End: 2021-08-18

## 2021-08-18 RX ADMIN — Medication 43.54 GRAM(S): at 18:12

## 2021-08-18 RX ADMIN — PROPOFOL 3.54 MICROGRAM(S)/KG/MIN: 10 INJECTION, EMULSION INTRAVENOUS at 07:31

## 2021-08-18 RX ADMIN — QUETIAPINE FUMARATE 50 MILLIGRAM(S): 200 TABLET, FILM COATED ORAL at 22:09

## 2021-08-18 RX ADMIN — DEXMEDETOMIDINE HYDROCHLORIDE IN 0.9% SODIUM CHLORIDE 2.95 MICROGRAM(S)/KG/HR: 4 INJECTION INTRAVENOUS at 07:31

## 2021-08-18 RX ADMIN — CHLORHEXIDINE GLUCONATE 15 MILLILITER(S): 213 SOLUTION TOPICAL at 05:16

## 2021-08-18 RX ADMIN — ENOXAPARIN SODIUM 40 MILLIGRAM(S): 100 INJECTION SUBCUTANEOUS at 22:12

## 2021-08-18 RX ADMIN — DEXMEDETOMIDINE HYDROCHLORIDE IN 0.9% SODIUM CHLORIDE 2.95 MICROGRAM(S)/KG/HR: 4 INJECTION INTRAVENOUS at 03:21

## 2021-08-18 RX ADMIN — PANTOPRAZOLE SODIUM 40 MILLIGRAM(S): 20 TABLET, DELAYED RELEASE ORAL at 21:14

## 2021-08-18 NOTE — BH CONSULTATION LIAISON ASSESSMENT NOTE - CURRENT MEDICATION
MEDICATIONS  (STANDING):  chlorhexidine 0.12% Liquid 15 milliLiter(s) Oral Mucosa every 12 hours  chlorhexidine 4% Liquid 1 Application(s) Topical <User Schedule>  dexMEDEtomidine Infusion 0.2 MICROgram(s)/kG/Hr (2.95 mL/Hr) IV Continuous <Continuous>  dextrose 40% Gel 15 Gram(s) Oral once  dextrose 5%. 1000 milliLiter(s) (50 mL/Hr) IV Continuous <Continuous>  dextrose 5%. 1000 milliLiter(s) (100 mL/Hr) IV Continuous <Continuous>  dextrose 50% Injectable 25 Gram(s) IV Push once  dextrose 50% Injectable 12.5 Gram(s) IV Push once  dextrose 50% Injectable 25 Gram(s) IV Push once  enoxaparin Injectable 40 milliGRAM(s) SubCutaneous every 24 hours  glucagon  Injectable 1 milliGRAM(s) IntraMuscular once  insulin lispro (ADMELOG) corrective regimen sliding scale   SubCutaneous every 6 hours  lactated ringers. 1000 milliLiter(s) (150 mL/Hr) IV Continuous <Continuous>  pantoprazole  Injectable 40 milliGRAM(s) IV Push every 24 hours    MEDICATIONS  (PRN):  benzocaine 15 mG/menthol 3.6 mG Lozenge 1 Lozenge Oral two times a day PRN Sore Throat

## 2021-08-18 NOTE — BH CONSULTATION LIAISON ASSESSMENT NOTE - NSBHADMITCOUNSEL_PSY_A_CORE
diagnostic results/impressions and/or recommended studies/risks and benefits of treatment options/risk factor reduction

## 2021-08-18 NOTE — BH CONSULTATION LIAISON ASSESSMENT NOTE - NSBHCHARTREVIEWVS_PSY_A_CORE FT
Vital Signs Last 24 Hrs  T(C): 37 (18 Aug 2021 12:00), Max: 37.1 (18 Aug 2021 00:54)  T(F): 98.6 (18 Aug 2021 12:00), Max: 98.8 (18 Aug 2021 00:54)  HR: 74 (18 Aug 2021 12:00) (60 - 85)  BP: 129/75 (18 Aug 2021 11:00) (100/56 - 143/99)  BP(mean): 95 (18 Aug 2021 11:00) (74 - 117)  RR: 27 (18 Aug 2021 12:00) (12 - 33)  SpO2: 96% (18 Aug 2021 12:00) (92% - 100%)

## 2021-08-18 NOTE — BH CONSULTATION LIAISON ASSESSMENT NOTE - SUMMARY
Pt is a 26yo M who was BIB EMS due to AMS and reported overdose.  Pt is somnolent and unresponsive on arrival to history obtained from EMS.  They report pt called EMS himself and reported heavy alcohol use followed by an intentional overdose of Unisom (doxylamine) and Benadyl.  Pt noted to have blue residue over mouth and nose.  EMS denies witnessed vomiting.  They report he was alert on their arrival but lost mental status en route.  No other medications found on scene.  No meds had tylenol in them. Pt found to have mild transaminitis with elevated serum acetaminophen, alcohol & positive urine amphetamines. Intubated and sedated for airway protection 08/16, admitted to MICU, s/p extubation 08/18. Psychiatry consulted for substance overdose. Pt attributes his overdose to worries about police arresting him and hearing voices of police and helicopters coming to get him after trying meth for the first time on Saturday. Pt has a history of alcohol use which he seems to downplay the severity of and he shared some episodes of low mood in the past, but he denies any current or prior suicidal ideation or attempts, denies hx of paranoia, huy/hypomania, or auditory/visual hallucinations. Pt feels safe in hospital but continues to express paranoid ideation regarding police coming to arrest him. Per collateral information, pt has heavy history of alcohol use, including several incidents where pt put himself in danger while intoxicated, and pt has expressed passive suicidal ideation at times.    Plan:  - Start seroquel 50mg q12hr for paranoia  - Low threshold for 1:1  - CL will continue to follow and reassess need for psychiatric admission Pt is a 24yo M who was BIB EMS due to AMS and reported overdose.  Pt is somnolent and unresponsive on arrival to history obtained from EMS.  They report pt called EMS himself and reported heavy alcohol use followed by an intentional overdose of Unisom (doxylamine) and Benadyl.  Pt noted to have blue residue over mouth and nose.  EMS denies witnessed vomiting.  They report he was alert on their arrival but lost mental status en route.  No other medications found on scene.  No meds had tylenol in them. Pt found to have mild transaminitis with elevated serum acetaminophen, alcohol & positive urine amphetamines. Intubated and sedated for airway protection 08/16, admitted to MICU, s/p extubation 08/18. Psychiatry consulted for substance overdose. Pt attributes his overdose to worries about police arresting him and hearing voices of police and helicopters coming to get him after trying meth for the first time on Saturday. Pt has a history of alcohol use which he seems to downplay the severity of and he shared some episodes of low mood in the past, but he denies any current or prior suicidal ideation or attempts, denies hx of paranoia, huy/hypomania, or auditory/visual hallucinations. Pt feels safe in hospital but continues to express paranoid ideation regarding police coming to arrest him. Per collateral information, patient did not have any mood symptoms or SI prior to admission.    Plan:  - Start seroquel 50mg q12hr for paranoia  -for agitation seroquel 50mg po q6h prn; if the patient refuses po, he can receive haldol 2mg im/iv q6h prn (monitor the ekg for qtc prolongation)  - Low threshold for 1:1  - CL will continue to follow and reassess need for psychiatric admission vs outpatient follow up

## 2021-08-18 NOTE — PROGRESS NOTE ADULT - SUBJECTIVE AND OBJECTIVE BOX
GASTROENTEROLOGY PROGRESS NOTE  Patient seen and examined at bedside.  remains sedated and intubated in ICU  LFTs rising  Completed NAC protocol    PERTINENT REVIEW OF SYSTEMS:  CONSTITUTIONAL: No weakness, fevers or chills  HEENT: No visual changes; No vertigo or throat pain   GASTROINTESTINAL: As above.  NEUROLOGICAL: No numbness or weakness  SKIN: No itching, burning, rashes, or lesions     Allergies    Allergy Status Unknown    Intolerances      MEDICATIONS:  MEDICATIONS  (STANDING):  chlorhexidine 0.12% Liquid 15 milliLiter(s) Oral Mucosa every 12 hours  chlorhexidine 4% Liquid 1 Application(s) Topical <User Schedule>  dexMEDEtomidine Infusion 0.2 MICROgram(s)/kG/Hr (2.95 mL/Hr) IV Continuous <Continuous>  dextrose 40% Gel 15 Gram(s) Oral once  dextrose 5%. 1000 milliLiter(s) (50 mL/Hr) IV Continuous <Continuous>  dextrose 5%. 1000 milliLiter(s) (100 mL/Hr) IV Continuous <Continuous>  dextrose 50% Injectable 25 Gram(s) IV Push once  dextrose 50% Injectable 12.5 Gram(s) IV Push once  dextrose 50% Injectable 25 Gram(s) IV Push once  enoxaparin Injectable 40 milliGRAM(s) SubCutaneous every 24 hours  glucagon  Injectable 1 milliGRAM(s) IntraMuscular once  insulin lispro (ADMELOG) corrective regimen sliding scale   SubCutaneous every 6 hours  lactated ringers. 1000 milliLiter(s) (150 mL/Hr) IV Continuous <Continuous>  pantoprazole  Injectable 40 milliGRAM(s) IV Push every 24 hours  propofol Infusion 10 MICROgram(s)/kG/Min (3.54 mL/Hr) IV Continuous <Continuous>    MEDICATIONS  (PRN):    Vital Signs Last 24 Hrs  T(C): 37.1 (18 Aug 2021 09:00), Max: 37.9 (17 Aug 2021 13:00)  T(F): 98.7 (18 Aug 2021 09:00), Max: 100.2 (17 Aug 2021 13:00)  HR: 69 (18 Aug 2021 09:36) (60 - 85)  BP: 131/84 (18 Aug 2021 09:10) (100/56 - 136/86)  BP(mean): 103 (18 Aug 2021 09:10) (74 - 110)  RR: 27 (18 Aug 2021 09:36) (12 - 30)  SpO2: 99% (18 Aug 2021 09:36) (92% - 100%)    08-17 @ 07:01  -  08-18 @ 07:00  --------------------------------------------------------  IN: 1873.9 mL / OUT: 1150 mL / NET: 723.9 mL      PHYSICAL EXAM:    General: sedated and intubated  HEENT: MMM, conjunctiva and sclera clear  Gastrointestinal: Soft non-tender non-distended; No rebound or guarding  Skin: Warm and dry. No obvious rash    LABS:                        13.5   12.84 )-----------( 206      ( 18 Aug 2021 05:12 )             39.9     08-18    139  |  109<H>  |  5<L>  ----------------------------<  106<H>  3.9   |  20<L>  |  0.74    Ca    8.3<L>      18 Aug 2021 05:12  Phos  2.8     08-18  Mg     1.7     08-18    TPro  6.0  /  Alb  3.5  /  TBili  1.4<H>  /  DBili  x   /  AST  496<H>  /  ALT  166<H>  /  AlkPhos  52  08-18    PT/INR - ( 18 Aug 2021 05:12 )   PT: 14.7 sec;   INR: 1.24          PTT - ( 18 Aug 2021 05:12 )  PTT:38.2 sec      Urinalysis Basic - ( 16 Aug 2021 17:39 )    Color: Yellow / Appearance: Clear / SG: >=1.030 / pH: x  Gluc: x / Ketone: 15 mg/dL  / Bili: NEGATIVE / Urobili: 0.2 E.U./dL   Blood: x / Protein: 100 mg/dL / Nitrite: NEGATIVE   Leuk Esterase: NEGATIVE / RBC: < 5 /HPF / WBC < 5 /HPF   Sq Epi: x / Non Sq Epi: 5-10 /HPF / Bacteria: Present /HPF                Culture - Blood (collected 17 Aug 2021 01:52)  Source: .Blood Blood  Preliminary Report (18 Aug 2021 02:02):    No growth to date.    Culture - Blood (collected 17 Aug 2021 01:52)  Source: .Blood Blood  Preliminary Report (18 Aug 2021 02:02):    No growth to date.      RADIOLOGY & ADDITIONAL STUDIES:  Reviewed
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
## 2021-08-18 NOTE — BH CONSULTATION LIAISON ASSESSMENT NOTE - CASE SUMMARY
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 Patient currently presents extubated, with some confusion and persistent paranoia about being arrested for using drugs (r/o delirium vs substance induced psychosis). Collaterals from friends show prior history of impulsivity and reckless behavior but no prior psychosis or suicidality. Will start seroquel standing 50mg po q12h for psychosis with prns for agitation seroquel 50mg po q6h prn /if refuses po Im/IV haldol 2mg q6h prn; -will continue to monitor the patient for persistence of symptoms while metabolizing the substance abused to establish the need of psychiatric admission vs outpatient follow up.

## 2021-08-18 NOTE — PROGRESS NOTE ADULT - SUBJECTIVE AND OBJECTIVE BOX
**incomplete** **incomplete**    OVERNIGHT EVENTS: No overnight events. LFT's uptrending.    SUBJECTIVE / INTERVAL HPI: Patient post extubation. Complaining of feeling nervous and some throat . Denies active SI/HI.    VITAL SIGNS:  Vital Signs Last 24 Hrs  T(C): 37.1 (18 Aug 2021 09:00), Max: 37.9 (17 Aug 2021 13:00)  T(F): 98.7 (18 Aug 2021 09:00), Max: 100.2 (17 Aug 2021 13:00)  HR: 74 (18 Aug 2021 12:00) (60 - 85)  BP: 129/75 (18 Aug 2021 11:00) (100/56 - 143/99)  BP(mean): 95 (18 Aug 2021 11:00) (74 - 117)  RR: 27 (18 Aug 2021 12:00) (12 - 33)  SpO2: 96% (18 Aug 2021 12:00) (92% - 100%)  I&O's Summary    17 Aug 2021 07:01  -  18 Aug 2021 07:00  --------------------------------------------------------  IN: 1873.9 mL / OUT: 1150 mL / NET: 723.9 mL    18 Aug 2021 07:01  -  18 Aug 2021 12:15  --------------------------------------------------------  IN: 0 mL / OUT: 1100 mL / NET: -1100 mL        PHYSICAL EXAM:    General: WDWN  HEENT: NC/AT; PERRL, anicteric sclera; MMM  Neck: supple  Cardiovascular: +S1/S2; RRR  Respiratory: CTA B/L; no W/R/R  Gastrointestinal: soft, NT/ND; +BSx4  Extremities: WWP; no edema, clubbing or cyanosis  Vascular: 2+ radial, DP/PT pulses B/L  Neurological: AAOx3; no focal deficits    MEDICATIONS:  MEDICATIONS  (STANDING):  chlorhexidine 0.12% Liquid 15 milliLiter(s) Oral Mucosa every 12 hours  chlorhexidine 4% Liquid 1 Application(s) Topical <User Schedule>  dexMEDEtomidine Infusion 0.2 MICROgram(s)/kG/Hr (2.95 mL/Hr) IV Continuous <Continuous>  dextrose 40% Gel 15 Gram(s) Oral once  dextrose 5%. 1000 milliLiter(s) (50 mL/Hr) IV Continuous <Continuous>  dextrose 5%. 1000 milliLiter(s) (100 mL/Hr) IV Continuous <Continuous>  dextrose 50% Injectable 25 Gram(s) IV Push once  dextrose 50% Injectable 12.5 Gram(s) IV Push once  dextrose 50% Injectable 25 Gram(s) IV Push once  enoxaparin Injectable 40 milliGRAM(s) SubCutaneous every 24 hours  glucagon  Injectable 1 milliGRAM(s) IntraMuscular once  insulin lispro (ADMELOG) corrective regimen sliding scale   SubCutaneous every 6 hours  lactated ringers. 1000 milliLiter(s) (150 mL/Hr) IV Continuous <Continuous>  pantoprazole  Injectable 40 milliGRAM(s) IV Push every 24 hours    MEDICATIONS  (PRN):  benzocaine 15 mG/menthol 3.6 mG Lozenge 1 Lozenge Oral two times a day PRN Sore Throat      ALLERGIES:  Allergies    Allergy Status Unknown    Intolerances        LABS:                        13.5   12.84 )-----------( 206      ( 18 Aug 2021 05:12 )             39.9     08-18    139  |  109<H>  |  5<L>  ----------------------------<  106<H>  3.9   |  20<L>  |  0.74    Ca    8.3<L>      18 Aug 2021 05:12  Phos  2.8     08-18  Mg     1.7     08-18    TPro  6.0  /  Alb  3.5  /  TBili  1.4<H>  /  DBili  x   /  AST  496<H>  /  ALT  166<H>  /  AlkPhos  52  08-18    PT/INR - ( 18 Aug 2021 05:12 )   PT: 14.7 sec;   INR: 1.24          PTT - ( 18 Aug 2021 05:12 )  PTT:38.2 sec  Urinalysis Basic - ( 16 Aug 2021 17:39 )    Color: Yellow / Appearance: Clear / SG: >=1.030 / pH: x  Gluc: x / Ketone: 15 mg/dL  / Bili: NEGATIVE / Urobili: 0.2 E.U./dL   Blood: x / Protein: 100 mg/dL / Nitrite: NEGATIVE   Leuk Esterase: NEGATIVE / RBC: < 5 /HPF / WBC < 5 /HPF   Sq Epi: x / Non Sq Epi: 5-10 /HPF / Bacteria: Present /HPF      CAPILLARY BLOOD GLUCOSE      POCT Blood Glucose.: 93 mg/dL (18 Aug 2021 11:31)      RADIOLOGY & ADDITIONAL TESTS: Reviewed.   **incomplete**    OVERNIGHT EVENTS: No overnight events. LFT's uptrending. Extubated this AM.    SUBJECTIVE / INTERVAL HPI: Patient post extubation. Ffeeling nervous and some throat pain. Denies active SI/HI.    VITAL SIGNS:  Vital Signs Last 24 Hrs  T(C): 37.1 (18 Aug 2021 09:00), Max: 37.9 (17 Aug 2021 13:00)  T(F): 98.7 (18 Aug 2021 09:00), Max: 100.2 (17 Aug 2021 13:00)  HR: 74 (18 Aug 2021 12:00) (60 - 85)  BP: 129/75 (18 Aug 2021 11:00) (100/56 - 143/99)  BP(mean): 95 (18 Aug 2021 11:00) (74 - 117)  RR: 27 (18 Aug 2021 12:00) (12 - 33)  SpO2: 96% (18 Aug 2021 12:00) (92% - 100%)  I&O's Summary    17 Aug 2021 07:01  -  18 Aug 2021 07:00  --------------------------------------------------------  IN: 1873.9 mL / OUT: 1150 mL / NET: 723.9 mL    18 Aug 2021 07:01  -  18 Aug 2021 12:15  --------------------------------------------------------  IN: 0 mL / OUT: 1100 mL / NET: -1100 mL        PHYSICAL EXAM:  General: In no acute distress  HEENT: NC/AT; PERRL, anicteric sclera; MMM  Neck: supple  Cardiovascular: +S1/S2; RRR  Respiratory: CTA B/L; no W/R/R  Gastrointestinal: soft, NT/ND; +BSx4  Extremities: WWP; no edema, clubbing or cyanosis  Vascular: 2+ radial, DP/PT pulses B/L  Neurological: AAOx3; no focal deficits    MEDICATIONS:  MEDICATIONS  (STANDING):  chlorhexidine 0.12% Liquid 15 milliLiter(s) Oral Mucosa every 12 hours  chlorhexidine 4% Liquid 1 Application(s) Topical <User Schedule>  dexMEDEtomidine Infusion 0.2 MICROgram(s)/kG/Hr (2.95 mL/Hr) IV Continuous <Continuous>  dextrose 40% Gel 15 Gram(s) Oral once  dextrose 5%. 1000 milliLiter(s) (50 mL/Hr) IV Continuous <Continuous>  dextrose 5%. 1000 milliLiter(s) (100 mL/Hr) IV Continuous <Continuous>  dextrose 50% Injectable 25 Gram(s) IV Push once  dextrose 50% Injectable 12.5 Gram(s) IV Push once  dextrose 50% Injectable 25 Gram(s) IV Push once  enoxaparin Injectable 40 milliGRAM(s) SubCutaneous every 24 hours  glucagon  Injectable 1 milliGRAM(s) IntraMuscular once  insulin lispro (ADMELOG) corrective regimen sliding scale   SubCutaneous every 6 hours  lactated ringers. 1000 milliLiter(s) (150 mL/Hr) IV Continuous <Continuous>  pantoprazole  Injectable 40 milliGRAM(s) IV Push every 24 hours    MEDICATIONS  (PRN):  benzocaine 15 mG/menthol 3.6 mG Lozenge 1 Lozenge Oral two times a day PRN Sore Throat      ALLERGIES:  Allergies    Allergy Status Unknown    Intolerances        LABS:                        13.5   12.84 )-----------( 206      ( 18 Aug 2021 05:12 )             39.9     08-18    139  |  109<H>  |  5<L>  ----------------------------<  106<H>  3.9   |  20<L>  |  0.74    Ca    8.3<L>      18 Aug 2021 05:12  Phos  2.8     08-18  Mg     1.7     08-18    TPro  6.0  /  Alb  3.5  /  TBili  1.4<H>  /  DBili  x   /  AST  496<H>  /  ALT  166<H>  /  AlkPhos  52  08-18    PT/INR - ( 18 Aug 2021 05:12 )   PT: 14.7 sec;   INR: 1.24          PTT - ( 18 Aug 2021 05:12 )  PTT:38.2 sec  Urinalysis Basic - ( 16 Aug 2021 17:39 )    Color: Yellow / Appearance: Clear / SG: >=1.030 / pH: x  Gluc: x / Ketone: 15 mg/dL  / Bili: NEGATIVE / Urobili: 0.2 E.U./dL   Blood: x / Protein: 100 mg/dL / Nitrite: NEGATIVE   Leuk Esterase: NEGATIVE / RBC: < 5 /HPF / WBC < 5 /HPF   Sq Epi: x / Non Sq Epi: 5-10 /HPF / Bacteria: Present /HPF      CAPILLARY BLOOD GLUCOSE      POCT Blood Glucose.: 93 mg/dL (18 Aug 2021 11:31)      RADIOLOGY & ADDITIONAL TESTS: Reviewed.   OVERNIGHT EVENTS: No overnight events. LFT's uptrending. Extubated this AM.    SUBJECTIVE / INTERVAL HPI: Patient post extubation. Feeling nervous and some throat pain. Denies active SI/HI.    VITAL SIGNS:  Vital Signs Last 24 Hrs  T(C): 37.1 (18 Aug 2021 09:00), Max: 37.9 (17 Aug 2021 13:00)  T(F): 98.7 (18 Aug 2021 09:00), Max: 100.2 (17 Aug 2021 13:00)  HR: 74 (18 Aug 2021 12:00) (60 - 85)  BP: 129/75 (18 Aug 2021 11:00) (100/56 - 143/99)  BP(mean): 95 (18 Aug 2021 11:00) (74 - 117)  RR: 27 (18 Aug 2021 12:00) (12 - 33)  SpO2: 96% (18 Aug 2021 12:00) (92% - 100%)  I&O's Summary    17 Aug 2021 07:01  -  18 Aug 2021 07:00  --------------------------------------------------------  IN: 1873.9 mL / OUT: 1150 mL / NET: 723.9 mL    18 Aug 2021 07:01  -  18 Aug 2021 12:15  --------------------------------------------------------  IN: 0 mL / OUT: 1100 mL / NET: -1100 mL        PHYSICAL EXAM:  General: In no acute distress  HEENT: NC/AT; PERRL, anicteric sclera; MMM  Neck: supple  Cardiovascular: +S1/S2; RRR  Respiratory: CTA B/L; no W/R/R  Gastrointestinal: soft, NT/ND; +BSx4  Extremities: WWP; no edema, clubbing or cyanosis  Vascular: 2+ radial, DP/PT pulses B/L  Neurological: AAOx3; no focal deficits    MEDICATIONS:  MEDICATIONS  (STANDING):  chlorhexidine 0.12% Liquid 15 milliLiter(s) Oral Mucosa every 12 hours  chlorhexidine 4% Liquid 1 Application(s) Topical <User Schedule>  dexMEDEtomidine Infusion 0.2 MICROgram(s)/kG/Hr (2.95 mL/Hr) IV Continuous <Continuous>  dextrose 40% Gel 15 Gram(s) Oral once  dextrose 5%. 1000 milliLiter(s) (50 mL/Hr) IV Continuous <Continuous>  dextrose 5%. 1000 milliLiter(s) (100 mL/Hr) IV Continuous <Continuous>  dextrose 50% Injectable 25 Gram(s) IV Push once  dextrose 50% Injectable 12.5 Gram(s) IV Push once  dextrose 50% Injectable 25 Gram(s) IV Push once  enoxaparin Injectable 40 milliGRAM(s) SubCutaneous every 24 hours  glucagon  Injectable 1 milliGRAM(s) IntraMuscular once  insulin lispro (ADMELOG) corrective regimen sliding scale   SubCutaneous every 6 hours  lactated ringers. 1000 milliLiter(s) (150 mL/Hr) IV Continuous <Continuous>  pantoprazole  Injectable 40 milliGRAM(s) IV Push every 24 hours    MEDICATIONS  (PRN):  benzocaine 15 mG/menthol 3.6 mG Lozenge 1 Lozenge Oral two times a day PRN Sore Throat      ALLERGIES:  Allergies    Allergy Status Unknown    Intolerances        LABS:                        13.5   12.84 )-----------( 206      ( 18 Aug 2021 05:12 )             39.9     08-18    139  |  109<H>  |  5<L>  ----------------------------<  106<H>  3.9   |  20<L>  |  0.74    Ca    8.3<L>      18 Aug 2021 05:12  Phos  2.8     08-18  Mg     1.7     08-18    TPro  6.0  /  Alb  3.5  /  TBili  1.4<H>  /  DBili  x   /  AST  496<H>  /  ALT  166<H>  /  AlkPhos  52  08-18    PT/INR - ( 18 Aug 2021 05:12 )   PT: 14.7 sec;   INR: 1.24          PTT - ( 18 Aug 2021 05:12 )  PTT:38.2 sec  Urinalysis Basic - ( 16 Aug 2021 17:39 )    Color: Yellow / Appearance: Clear / SG: >=1.030 / pH: x  Gluc: x / Ketone: 15 mg/dL  / Bili: NEGATIVE / Urobili: 0.2 E.U./dL   Blood: x / Protein: 100 mg/dL / Nitrite: NEGATIVE   Leuk Esterase: NEGATIVE / RBC: < 5 /HPF / WBC < 5 /HPF   Sq Epi: x / Non Sq Epi: 5-10 /HPF / Bacteria: Present /HPF      CAPILLARY BLOOD GLUCOSE      POCT Blood Glucose.: 93 mg/dL (18 Aug 2021 11:31)      RADIOLOGY & ADDITIONAL TESTS: Reviewed.

## 2021-08-18 NOTE — PROGRESS NOTE ADULT - ATTENDING COMMENTS
#Acetaminophen overdose  - Likely presented within 24h of OD, although do not know exact timing  - Presented to [facility] ~ 1720 8/16  - Tylenol lvl >300 @ 1800 8/16 --> 8 @ 500 8/18  - Initial AST 99, ALT 48, Alk Phos 60, Bili 0.9, INR ~1, Lactate 10 -> most recent AST/ ALT uptrending to 496/166 INR 1.24, lactate 1.8, phos 2.8  - S/p activated charcoal  - Completed 20h NAC protocol (started 2032 8/16)     --9g IV over first 60min     --3g IV over 4 hrs     --6g IV over 16hrs  - Continue NAC 9g over next 24hrs  - Poison control notified, appreciate recommendations  - Anticipate LFTs to continue to rise as delayed response  - [facility] criteria: 0   - Does not meet criteria for referral to liver transplant at this time  - Monitor LFTs, INR, pH, Cr, lactate, phos for re-

## 2021-08-18 NOTE — PROGRESS NOTE ADULT - ASSESSMENT
25M BIB EMS d/t AMS from intentional OD of Tylenol, Alcohol, Unisom (doxylamine), & Benadryl of unknown quantity. Found to have mild transaminitis with elevated serum acetaminophen, alcohol & positive urine amphetamines. Admitted to MICU for airway protection, NAC infusion, monitoring of mental status & liver function. Poison control following. GI consulted for Tylenol overdose.    #Acetaminophen overdose  - Likely presented within 24h of OD, although do not know exact timing  - Presented to [facility] ~ 1720 8/16  - Tylenol lvl >300 @ 1800 8/16 --> 8 @ 500 8/18  - Initial AST 99, ALT 48, Alk Phos 60, Bili 0.9, INR ~1, Lactate 10 -> most recent AST/ ALT uptrending to 496/166 INR 1.24, lactate 1.8, phos 2.8  - S/p activated charcoal  - On 20h NAC protocol (started 2032 8/16)     --9g IV over first 60min     --3g IV over 4 hrs     --6g IV over 16hrs  - Poison control notified, appreciate recommendations  - Anticipate LFTs to continue to rise as delayed response  - [facility] criteria: 0   - Does not meet criteria for referral to liver transplant at this time  - Monitor LFTs, INR, pH, Cr, lactate, phos for re- assessment    Lupe Saleh MD  Gastroenterology Fellow, PGY -4   Pager [phone]  [phone] 25M BIB EMS d/t AMS from intentional OD of Tylenol, Alcohol, Unisom (doxylamine), & Benadryl of unknown quantity. Found to have mild transaminitis with elevated serum acetaminophen, alcohol & positive urine amphetamines. Admitted to MICU for airway protection, NAC infusion, monitoring of mental status & liver function. Poison control following. GI consulted for Tylenol overdose.    #Acetaminophen overdose  - Likely presented within 24h of OD, although do not know exact timing  - Presented to [facility] ~ 1720 8/16  - Tylenol lvl >300 @ 1800 8/16 --> 8 @ 500 8/18  - Initial AST 99, ALT 48, Alk Phos 60, Bili 0.9, INR ~1, Lactate 10 -> most recent AST/ ALT uptrending to 496/166 INR 1.24, lactate 1.8, phos 2.8  - S/p activated charcoal  - Completed 20h NAC protocol (started 2032 8/16)     --9g IV over first 60min     --3g IV over 4 hrs     --6g IV over 16hrs  - Continue NAC 9g over next 24hrs  - Poison control notified, appreciate recommendations  - Anticipate LFTs to continue to rise as delayed response  - [facility] criteria: 0   - Does not meet criteria for referral to liver transplant at this time  - Monitor LFTs, INR, pH, Cr, lactate, phos for re- assessment    Lupe Saleh MD  Gastroenterology Fellow, PGY -4   Pager [phone]  [phone]

## 2021-08-18 NOTE — BH CONSULTATION LIAISON ASSESSMENT NOTE - DIFFERENTIAL
Alcohol use disorder; substance induced mood disorder; r/o bipolar disorder; cluster B personality disorder traits Alcohol use disorder; amphetamine abuse  substance induced mood d/o vs psychotic disorder; r/o delirium, r/o bipolar disorder; cluster B personality disorder traits

## 2021-08-18 NOTE — PROGRESS NOTE ADULT - ASSESSMENT
25M BIB EMS d/t AMS from intentional alcohol & drug OD of Unisom (doxylamine) Tylenol, & Benadryl of unknown quantity. Found to have mild transaminitis with elevated serum acetaminophen, alcohol & positive urine amphetamines. Admitted to MICU for airway protection, NAC infusion, monitoring of mental status & liver function.    NEURO  # Metabolic encephalopathy likely 2/2 hepatotoxicity & electrolyte abnormalities  Hx of acetaminophen, anticholinergic & alcohol OD. Pt was alert on EMS arrival but lost mental status en route to [facility], AAOx0.  - Intubated & Sedated for airway protection  - Vent VC/AC 40/400/18/5, keep SpO2 >90%  - transition sedation from versed to propofol and precedex, CPAP trial, patient   - NGT placed, position confirmed by radiology  - Underlying conditions treated (see below)  - Electrolytes repleted  - Received PO Charcoal & NAC  - If patient shows signs of alcohol withdrawal manage with phenobarb on [facility]WA protocol      PSYCH  # Intentional Alcohol & Drug OD  - consult psych once mental status regained  - consider 1:1 if SI    CARDIO:   -Repeat EKG's Q6     PULM  # Acute hypoxemic respiratory failure  - Intubated & Sedated for airway protection  - Vent VC/AC 40/400/18/5, keep SpO2 >90%  - transition sedation from versed to propofol and Precedex CPAP trial, potential extubation today  - NGT placed, position confirmed by radiology    GI  # Hepatotoxicity 2/2 Acetaminophen OD  Hx of Acetaminophen OD of unknown quantity. At [facility] ED received charcoal 2x50mg PO  - Started on NAC infusion 9g over 16hrs @ 21:04 on 8/16.  - AST 99 > 78 > 54  - ALT 48 > 32 > 27  - Trend LFTs q6h  - PT/INT, PTT WNL  - Case reported to [place] poison control #[phone]  - They w contact us for f/u on 8/17 around 6PM  - f/u GI recs (spoke w Dr. Johnson, GI fellow w f/u in AM)  - f/u poison control recs (left message for Lyn Pizarro #[id number])    ENDO  - on ISS    RENAL  # Hypomagnesemia  1.0 > 1.8 > 2.1  - Repleted    # Hypokalemia  2.7 > 3.1 > 3.0  - Received in total 10mEq x5 IV & 40mEq via NGT  - 1g calcium gluconate IVP for cardiac protection.    - Repleted    HEMEONC: no active issues    ID: no active issues    MSK  # Anticholinergic poisoning  Hx of Benadryl OD  - hypothermic presentation disagrees with sx of anticholinergic syndrome  - Physostigmine not started  - Calcium gluconate given for cardiac protection    # c/f Rhabdomyolysis  - CK 2534 ---> 1377  - trend CK  - On LR @150mL/hr    F: None   E: Replete as necessary K>4 Mg>2  N: NPO  DVT Prophylaxis: Lovenox 40mg SQ q24h  GI prophylaxis: Protonix 40mg IVP q24h   CODE STATUS: FULL   	  25M BIB EMS d/t AMS from intentional alcohol & drug OD of Unisom (doxylamine) Tylenol, & Benadryl of unknown quantity. Found to have mild transaminitis with elevated serum acetaminophen, alcohol & positive urine amphetamines. Admitted to MICU for airway protection, NAC infusion, monitoring of mental status & liver function. Extubated 8/18.    NEURO  # Metabolic encephalopathy likely 2/2 hepatotoxicity & electrolyte abnormalities  Hx of acetaminophen, anticholinergic & alcohol OD. Pt was alert on EMS arrival but lost mental status en route to [facility], AAOx0. Extubated (8/18) AAOx3.  - Extubated  - Underlying conditions treated (see below)  - Electrolytes repleted  - Received PO Charcoal & NAC  - If patient shows signs of alcohol withdrawal manage with phenobarb on [facility] protocol      PSYCH  # Intentional Alcohol & Drug OD  - psych consult, f/u recs  - 1:1 on    CARDIO:   -Repeat EKG's Q6     PULM  # Acute hypoxemic respiratory failure  - Extubated and breathing comfortably    GI  # Hepatotoxicity 2/2 Acetaminophen OD  Hx of Acetaminophen OD of unknown quantity. At [facility] ED received charcoal 2x50mg PO  - Completed NAC infusion 9g over 16hrs @ 21:04 on 8/16.  - AST 90 > 161 > 301 > 496  - ALT 33 > 66 > 124 > 166  - Trend LFTs q6h  - PTT 38.2, PT 14.7, INR 1.24  - Case reported to [place] poison control #[phone]  - They w contact us for f/u on 8/17 around 6PM  - f/u GI recs (spoke w Dr. Johnson, GI fellow w f/u in AM)  - f/u poison control recs (left message for Lyn Pizarro #[id number])    ENDO  - on ISS    RENAL  # Hypomagnesemia- RESOLVED  1.0 > 1.8 > 2.1  - Repleted    # Hypokalemia - RESOLVED  2.7 > 3.1 > 3.0  - Received in total 10mEq x5 IV & 40mEq via NGT  - 1g calcium gluconate IVP for cardiac protection.    - Repleted    HEMEONC: no active issues    ID: no active issues    MSK  # Anticholinergic poisoning  Hx of Benadryl OD  - hypothermic presentation disagrees with sx of anticholinergic syndrome  - Physostigmine not started  - Calcium gluconate given for cardiac protection    # c/f Rhabdomyolysis  - CK downtrending, 2534 ---> 1377 --> 1044  - trend CK  - On LR @150mL/hr    F: None   E: Replete as necessary K>4 Mg>2  N: NPO  DVT Prophylaxis: Lovenox 40mg SQ q24h  GI prophylaxis: Protonix 40mg IVP q24h   CODE STATUS: FULL   	  25M BIB EMS d/t AMS from intentional alcohol & drug OD of Unisom (doxylamine) Tylenol, & Benadryl of unknown quantity. Found to have mild transaminitis with elevated serum acetaminophen, alcohol & positive urine amphetamines. Admitted to MICU for airway protection, NAC infusion, monitoring of mental status & liver function. Extubated 8/18.    NEURO  # Metabolic encephalopathy likely 2/2 hepatotoxicity & electrolyte abnormalities  Hx of acetaminophen, anticholinergic & alcohol OD. Pt was alert on EMS arrival but lost mental status en route to [facility], AAOx0. Extubated (8/18) AAOx3.  - Extubated  - Underlying conditions treated (see below)  - Electrolytes repleted  - Received PO Charcoal & NAC  - If patient shows signs of alcohol withdrawal manage with phenobarb on [facility] protocol      PSYCH  # Intentional Alcohol & Drug OD  - psych consult, f/u recs  - 1:1 on  - started on Seroquel 50 mg q 12 hours     CARDIO:   -Repeat EKG's Q6     PULM  # Acute hypoxemic respiratory failure  - Extubated and breathing comfortably    GI  # Hepatotoxicity 2/2 Acetaminophen OD  Hx of Acetaminophen OD of unknown quantity. At [facility] ED received charcoal 2x50mg PO  - Completed NAC infusion 9g over 16hrs @ 21:04 on 8/16.  - AST 90 > 161 > 301 > 496  - ALT 33 > 66 > 124 > 166  - Trend LFTs q6h  - PTT 38.2, PT 14.7, INR 1.24  - Case reported to [place] poison control #[phone]  - They w contact us for f/u on 8/17 around 6PM  - f/u GI recs (spoke w Dr. Johnson, GI fellow w f/u in AM)  - f/u poison control recs (left message for Lyn Pizarro #[id number])  - 9 gram NAC given on 8/18 per poison control recommendations as LFTs were still uptrending    ENDO  - on ISS    RENAL  # Hypomagnesemia- RESOLVED  1.0 > 1.8 > 2.1  - Repleted    # Hypokalemia - RESOLVED  2.7 > 3.1 > 3.0  - Received in total 10mEq x5 IV & 40mEq via NGT  - 1g calcium gluconate IVP for cardiac protection.    - Repleted    HEMEONC: no active issues    ID: no active issues    MSK  # Anticholinergic poisoning  Hx of Benadryl OD  - hypothermic presentation disagrees with sx of anticholinergic syndrome  - Physostigmine not started  - Calcium gluconate given for cardiac protection    # c/f Rhabdomyolysis  - CK downtrending, 2534 ---> 1377 --> 1044  - trend CK  - On LR @150mL/hr    F: None   E: Replete as necessary K>4 Mg>2  N: NPO  DVT Prophylaxis: Lovenox 40mg SQ q24h  GI prophylaxis: Protonix 40mg IVP q24h   CODE STATUS: FULL

## 2021-08-18 NOTE — PROGRESS NOTE ADULT - ATTENDING COMMENTS
Intentional overdose with Tylenol, Benadryl, Doxylamine and EtOH with acute hypoxemic respiratory failure. Has rhabdo which is resolving. Tylenol level w/i normal; s/p NAC; LFTs continue to rise so will need to monitor this closely. Had QTC prolongation now resolved. Successful pressure support trials; will extubate today. When fully awake will get psych involved; needs 1:1 when extubated.

## 2021-08-18 NOTE — BH CONSULTATION LIAISON ASSESSMENT NOTE - NSBHMSETHTPROC_PSY_A_CORE
Progress Notes by Marissa Guardado at 11/21/17 09:20 AM     Author:  Marissa Guardado Service:  (none) Author Type:  Patient      Filed:  11/21/17 09:21 AM Encounter Date:  11/17/2017 Status:  Signed     :  Marissa Guardado (Patient )            Called patient[CV1.1T] and left 3rd message on voicemail that a member of the household has physician orders for therapy and to call back to schedule appointments at [phone]. Sent trying to reach you letter.[CV1.1M]       Revision History        User Key Date/Time User Provider Type Action    > CV1.1 11/21/17 09:21 AM Marissa Guardado Patient  Sign    M - Manual, T - Template             Thought blocking/Impaired reasoning

## 2021-08-19 LAB
ALBUMIN SERPL ELPH-MCNC: 3.4 G/DL — SIGNIFICANT CHANGE UP (ref 3.3–5)
ALP SERPL-CCNC: 73 U/L — SIGNIFICANT CHANGE UP (ref 40–120)
ALT FLD-CCNC: 205 U/L — HIGH (ref 10–45)
ANION GAP SERPL CALC-SCNC: 11 MMOL/L — SIGNIFICANT CHANGE UP (ref 5–17)
APTT BLD: 36.6 SEC — HIGH (ref 27.5–35.5)
AST SERPL-CCNC: 379 U/L — HIGH (ref 10–40)
BASOPHILS # BLD AUTO: 0 K/UL — SIGNIFICANT CHANGE UP (ref 0–0.2)
BASOPHILS NFR BLD AUTO: 0 % — SIGNIFICANT CHANGE UP (ref 0–2)
BILIRUB SERPL-MCNC: 1 MG/DL — SIGNIFICANT CHANGE UP (ref 0.2–1.2)
BUN SERPL-MCNC: 4 MG/DL — LOW (ref 7–23)
CALCIUM SERPL-MCNC: 8.4 MG/DL — SIGNIFICANT CHANGE UP (ref 8.4–10.5)
CHLORIDE SERPL-SCNC: 103 MMOL/L — SIGNIFICANT CHANGE UP (ref 96–108)
CO2 SERPL-SCNC: 24 MMOL/L — SIGNIFICANT CHANGE UP (ref 22–31)
CREAT SERPL-MCNC: 0.73 MG/DL — SIGNIFICANT CHANGE UP (ref 0.5–1.3)
EOSINOPHIL # BLD AUTO: 0.23 K/UL — SIGNIFICANT CHANGE UP (ref 0–0.5)
EOSINOPHIL NFR BLD AUTO: 1.7 % — SIGNIFICANT CHANGE UP (ref 0–6)
GIANT PLATELETS BLD QL SMEAR: PRESENT — SIGNIFICANT CHANGE UP
GLUCOSE BLDC GLUCOMTR-MCNC: 104 MG/DL — HIGH (ref 70–99)
GLUCOSE BLDC GLUCOMTR-MCNC: 107 MG/DL — HIGH (ref 70–99)
GLUCOSE BLDC GLUCOMTR-MCNC: 124 MG/DL — HIGH (ref 70–99)
GLUCOSE SERPL-MCNC: 120 MG/DL — HIGH (ref 70–99)
HCT VFR BLD CALC: 34.4 % — LOW (ref 39–50)
HCV AB S/CO SERPL IA: 0.04 S/CO — SIGNIFICANT CHANGE UP
HCV AB SERPL-IMP: SIGNIFICANT CHANGE UP
HGB BLD-MCNC: 11.9 G/DL — LOW (ref 13–17)
HIV 1+2 AB+HIV1 P24 AG SERPL QL IA: SIGNIFICANT CHANGE UP
INR BLD: 1.38 — HIGH (ref 0.88–1.16)
LYMPHOCYTES # BLD AUTO: 0.95 K/UL — LOW (ref 1–3.3)
LYMPHOCYTES # BLD AUTO: 7.1 % — LOW (ref 13–44)
MACROCYTES BLD QL: SLIGHT — SIGNIFICANT CHANGE UP
MAGNESIUM SERPL-MCNC: 1.6 MG/DL — SIGNIFICANT CHANGE UP (ref 1.6–2.6)
MANUAL SMEAR VERIFICATION: SIGNIFICANT CHANGE UP
MCHC RBC-ENTMCNC: 33 PG — SIGNIFICANT CHANGE UP (ref 27–34)
MCHC RBC-ENTMCNC: 34.6 GM/DL — SIGNIFICANT CHANGE UP (ref 32–36)
MCV RBC AUTO: 95.3 FL — SIGNIFICANT CHANGE UP (ref 80–100)
MICROCYTES BLD QL: SLIGHT — SIGNIFICANT CHANGE UP
MONOCYTES # BLD AUTO: 0.24 K/UL — SIGNIFICANT CHANGE UP (ref 0–0.9)
MONOCYTES NFR BLD AUTO: 1.8 % — LOW (ref 2–14)
NEUTROPHILS # BLD AUTO: 11.99 K/UL — HIGH (ref 1.8–7.4)
NEUTROPHILS NFR BLD AUTO: 87.6 % — HIGH (ref 43–77)
NEUTS BAND # BLD: 1.8 % — SIGNIFICANT CHANGE UP (ref 0–8)
OVALOCYTES BLD QL SMEAR: SLIGHT — SIGNIFICANT CHANGE UP
PHOSPHATE SERPL-MCNC: 2.1 MG/DL — LOW (ref 2.5–4.5)
PLAT MORPH BLD: ABNORMAL
PLATELET # BLD AUTO: 196 K/UL — SIGNIFICANT CHANGE UP (ref 150–400)
POLYCHROMASIA BLD QL SMEAR: SLIGHT — SIGNIFICANT CHANGE UP
POTASSIUM SERPL-MCNC: 3.1 MMOL/L — LOW (ref 3.5–5.3)
POTASSIUM SERPL-SCNC: 3.1 MMOL/L — LOW (ref 3.5–5.3)
PROT SERPL-MCNC: 5.8 G/DL — LOW (ref 6–8.3)
PROTHROM AB SERPL-ACNC: 16.3 SEC — HIGH (ref 10.6–13.6)
RBC # BLD: 3.61 M/UL — LOW (ref 4.2–5.8)
RBC # FLD: 12 % — SIGNIFICANT CHANGE UP (ref 10.3–14.5)
RBC BLD AUTO: ABNORMAL
SODIUM SERPL-SCNC: 138 MMOL/L — SIGNIFICANT CHANGE UP (ref 135–145)
SPHEROCYTES BLD QL SMEAR: SLIGHT — SIGNIFICANT CHANGE UP
WBC # BLD: 13.41 K/UL — HIGH (ref 3.8–10.5)
WBC # FLD AUTO: 13.41 K/UL — HIGH (ref 3.8–10.5)

## 2021-08-19 PROCEDURE — 99233 SBSQ HOSP IP/OBS HIGH 50: CPT | Mod: GC

## 2021-08-19 PROCEDURE — 99232 SBSQ HOSP IP/OBS MODERATE 35: CPT

## 2021-08-19 PROCEDURE — 99233 SBSQ HOSP IP/OBS HIGH 50: CPT

## 2021-08-19 RX ORDER — POTASSIUM CHLORIDE 20 MEQ
40 PACKET (EA) ORAL
Refills: 0 | Status: COMPLETED | OUTPATIENT
Start: 2021-08-19 | End: 2021-08-19

## 2021-08-19 RX ORDER — QUETIAPINE FUMARATE 200 MG/1
100 TABLET, FILM COATED ORAL AT BEDTIME
Refills: 0 | Status: DISCONTINUED | OUTPATIENT
Start: 2021-08-19 | End: 2021-08-23

## 2021-08-19 RX ORDER — PANTOPRAZOLE SODIUM 20 MG/1
40 TABLET, DELAYED RELEASE ORAL AT BEDTIME
Refills: 0 | Status: DISCONTINUED | OUTPATIENT
Start: 2021-08-19 | End: 2021-08-23

## 2021-08-19 RX ORDER — ACETYLCYSTEINE 200 MG/ML
3 VIAL (ML) MISCELLANEOUS ONCE
Refills: 0 | Status: DISCONTINUED | OUTPATIENT
Start: 2021-08-19 | End: 2021-08-19

## 2021-08-19 RX ORDER — IBUPROFEN 200 MG
400 TABLET ORAL ONCE
Refills: 0 | Status: COMPLETED | OUTPATIENT
Start: 2021-08-19 | End: 2021-08-19

## 2021-08-19 RX ORDER — IBUPROFEN 200 MG
600 TABLET ORAL ONCE
Refills: 0 | Status: COMPLETED | OUTPATIENT
Start: 2021-08-19 | End: 2021-08-19

## 2021-08-19 RX ORDER — ACETYLCYSTEINE 200 MG/ML
9 VIAL (ML) MISCELLANEOUS ONCE
Refills: 0 | Status: COMPLETED | OUTPATIENT
Start: 2021-08-19 | End: 2021-08-19

## 2021-08-19 RX ADMIN — Medication 600 MILLIGRAM(S): at 15:58

## 2021-08-19 RX ADMIN — Medication 400 MILLIGRAM(S): at 06:00

## 2021-08-19 RX ADMIN — Medication 40 MILLIEQUIVALENT(S): at 11:09

## 2021-08-19 RX ADMIN — QUETIAPINE FUMARATE 100 MILLIGRAM(S): 200 TABLET, FILM COATED ORAL at 23:59

## 2021-08-19 RX ADMIN — PANTOPRAZOLE SODIUM 40 MILLIGRAM(S): 20 TABLET, DELAYED RELEASE ORAL at 23:59

## 2021-08-19 RX ADMIN — ENOXAPARIN SODIUM 40 MILLIGRAM(S): 100 INJECTION SUBCUTANEOUS at 23:59

## 2021-08-19 RX ADMIN — Medication 400 MILLIGRAM(S): at 05:09

## 2021-08-19 RX ADMIN — Medication 40 MILLIEQUIVALENT(S): at 07:13

## 2021-08-19 RX ADMIN — CHLORHEXIDINE GLUCONATE 1 APPLICATION(S): 213 SOLUTION TOPICAL at 05:18

## 2021-08-19 RX ADMIN — Medication 600 MILLIGRAM(S): at 16:58

## 2021-08-19 RX ADMIN — QUETIAPINE FUMARATE 50 MILLIGRAM(S): 200 TABLET, FILM COATED ORAL at 11:34

## 2021-08-19 RX ADMIN — Medication 43.54 GRAM(S): at 19:28

## 2021-08-19 NOTE — PROGRESS NOTE ADULT - SUBJECTIVE AND OBJECTIVE BOX
25M BIB EMS d/t AMS from intentional alcohol & drug OD of Unisom (doxylamine) Tylenol, & Benadryl of unknown quantity while on methamphetamines. Found to have mild transaminitis with elevated serum acetaminophen, alcohol & positive urine amphetamines. Intubated and given calcium gluconate. No signs of anticholinergic toxicity. Admitted to MICU for airway protection, NAC infusion, monitoring of mental status & liver function. GI and poison control consulted. Extubated 8/18. Received 2nd NAC infusion on 8/18 due to increasing LFT's. Pt endorses 5 alcoholic drinks 5 times a week. Not showing any signs of withdrawal. LFT's, CK, all now downtrending. Psych following for SI, paranoia and substance use. Started on Seroquel for paranoia.    SUBJECTIVE / INTERVAL HPI: Patient seen and examined at bedside.     VITAL SIGNS:  Vital Signs Last 24 Hrs  T(C): 36.6 (19 Aug 2021 21:20), Max: 37.7 (19 Aug 2021 05:00)  T(F): 97.8 (19 Aug 2021 21:20), Max: 99.9 (19 Aug 2021 05:00)  HR: 76 (19 Aug 2021 21:20) (76 - 108)  BP: 120/77 (19 Aug 2021 21:20) (109/69 - 132/88)  BP(mean): 102 (19 Aug 2021 16:22) (80 - 102)  RR: 18 (19 Aug 2021 21:20) (18 - 37)  SpO2: 96% (19 Aug 2021 21:20) (94% - 98%)    PHYSICAL EXAM:    General: WDWN  HEENT: NC/AT; PERRL, anicteric sclera; MMM  Neck: supple  Cardiovascular: +S1/S2, RRR  Respiratory: CTA B/L; no W/R/R  Gastrointestinal: soft, NT/ND; +BSx4  Extremities: WWP; no edema, clubbing or cyanosis  Vascular: 2+ radial, DP/PT pulses B/L  Neurological: AAOx3; no focal deficits    MEDICATIONS:  MEDICATIONS  (STANDING):  dextrose 40% Gel 15 Gram(s) Oral once  dextrose 5%. 1000 milliLiter(s) (50 mL/Hr) IV Continuous <Continuous>  dextrose 5%. 1000 milliLiter(s) (100 mL/Hr) IV Continuous <Continuous>  dextrose 50% Injectable 25 Gram(s) IV Push once  dextrose 50% Injectable 12.5 Gram(s) IV Push once  dextrose 50% Injectable 25 Gram(s) IV Push once  enoxaparin Injectable 40 milliGRAM(s) SubCutaneous every 24 hours  glucagon  Injectable 1 milliGRAM(s) IntraMuscular once  pantoprazole    Tablet 40 milliGRAM(s) Oral at bedtime  QUEtiapine 100 milliGRAM(s) Oral at bedtime    MEDICATIONS  (PRN):  benzocaine 15 mG/menthol 3.6 mG Lozenge 1 Lozenge Oral two times a day PRN Sore Throat  QUEtiapine 50 milliGRAM(s) Oral every 6 hours PRN agitation      ALLERGIES:  Allergies    Allergy Status Unknown    Intolerances        LABS:                        11.9   13.41 )-----------( 196      ( 19 Aug 2021 05:36 )             34.4     08-19    138  |  103  |  4<L>  ----------------------------<  120<H>  3.1<L>   |  24  |  0.73    Ca    8.4      19 Aug 2021 05:36  Phos  2.1     08-19  Mg     1.6     08-19    TPro  5.8<L>  /  Alb  3.4  /  TBili  1.0  /  DBili  x   /  AST  379<H>  /  ALT  205<H>  /  AlkPhos  73  08-19    PT/INR - ( 19 Aug 2021 05:36 )   PT: 16.3 sec;   INR: 1.38          PTT - ( 19 Aug 2021 05:36 )  PTT:36.6 sec    CAPILLARY BLOOD GLUCOSE      POCT Blood Glucose.: 107 mg/dL (19 Aug 2021 17:35)      RADIOLOGY & ADDITIONAL TESTS: Reviewed. 25M BIB EMS d/t AMS from intentional alcohol & drug OD of Unisom (doxylamine) Tylenol, & Benadryl of unknown quantity while on methamphetamines. Found to have mild transaminitis with elevated serum acetaminophen, alcohol & positive urine amphetamines. Intubated and given calcium gluconate. No signs of anticholinergic toxicity. Admitted to MICU for airway protection, NAC infusion, monitoring of mental status & liver function. GI and poison control consulted. Extubated 8/18. Received 2nd NAC infusion on 8/18 due to increasing LFT's. Pt endorses 5 alcoholic drinks 5 times a week. Not showing any signs of withdrawal. LFT's, CK, all now downtrending. Psych following for SI, paranoia and substance use. Started on Seroquel for paranoia.    SUBJECTIVE / INTERVAL HPI: Patient seen and examined at bedside. Pt feels well with no complaints. Pt endorses some mild lightheadedness which has been present in the past, but is otherwise feeling well. Denies any CP, nausea, vomiting, SOB, abdominal pain, fevers or chills.    VITAL SIGNS:  Vital Signs Last 24 Hrs  T(C): 36.6 (19 Aug 2021 21:20), Max: 37.7 (19 Aug 2021 05:00)  T(F): 97.8 (19 Aug 2021 21:20), Max: 99.9 (19 Aug 2021 05:00)  HR: 76 (19 Aug 2021 21:20) (76 - 108)  BP: 120/77 (19 Aug 2021 21:20) (109/69 - 132/88)  BP(mean): 102 (19 Aug 2021 16:22) (80 - 102)  RR: 18 (19 Aug 2021 21:20) (18 - 37)  SpO2: 96% (19 Aug 2021 21:20) (94% - 98%)    PHYSICAL EXAM:    General: Pt well appearing in no acute distress.   HEENT: NC/AT; PERRL, anicteric sclera; MMM  Neck: supple  Cardiovascular: +S1/S2, RRR  Respiratory: CTA B/L; no W/R/R  Gastrointestinal: soft, NT/ND; +BSx4  Extremities: WWP; no edema, clubbing or cyanosis  Vascular: 2+ radial, DP/PT pulses B/L  Neurological: AAOx3; no focal deficits    MEDICATIONS:  MEDICATIONS  (STANDING):  dextrose 40% Gel 15 Gram(s) Oral once  dextrose 5%. 1000 milliLiter(s) (50 mL/Hr) IV Continuous <Continuous>  dextrose 5%. 1000 milliLiter(s) (100 mL/Hr) IV Continuous <Continuous>  dextrose 50% Injectable 25 Gram(s) IV Push once  dextrose 50% Injectable 12.5 Gram(s) IV Push once  dextrose 50% Injectable 25 Gram(s) IV Push once  enoxaparin Injectable 40 milliGRAM(s) SubCutaneous every 24 hours  glucagon  Injectable 1 milliGRAM(s) IntraMuscular once  pantoprazole    Tablet 40 milliGRAM(s) Oral at bedtime  QUEtiapine 100 milliGRAM(s) Oral at bedtime    MEDICATIONS  (PRN):  benzocaine 15 mG/menthol 3.6 mG Lozenge 1 Lozenge Oral two times a day PRN Sore Throat  QUEtiapine 50 milliGRAM(s) Oral every 6 hours PRN agitation      ALLERGIES:  Allergies    Allergy Status Unknown    Intolerances        LABS:                        11.9   13.41 )-----------( 196      ( 19 Aug 2021 05:36 )             34.4     08-19    138  |  103  |  4<L>  ----------------------------<  120<H>  3.1<L>   |  24  |  0.73    Ca    8.4      19 Aug 2021 05:36  Phos  2.1     08-19  Mg     1.6     08-19    TPro  5.8<L>  /  Alb  3.4  /  TBili  1.0  /  DBili  x   /  AST  379<H>  /  ALT  205<H>  /  AlkPhos  73  08-19    PT/INR - ( 19 Aug 2021 05:36 )   PT: 16.3 sec;   INR: 1.38          PTT - ( 19 Aug 2021 05:36 )  PTT:36.6 sec    CAPILLARY BLOOD GLUCOSE      POCT Blood Glucose.: 107 mg/dL (19 Aug 2021 17:35)      RADIOLOGY & ADDITIONAL TESTS: Reviewed.

## 2021-08-19 NOTE — PROGRESS NOTE ADULT - PROBLEM SELECTOR PLAN 2
Increased CK now downtrending, 2534 ---> 1377 --> 1044. No RBC in urine. S/p LR infusion 150mL/hr 12 hours.   -CK is downtrending, continue to monitor  -Can consider maintenance fluids if not resolving Hx of intentional acetaminophen, anticholinergic & alcohol OD. Pt was alert on EMS arrival but lost mental status en route to [facility], was A&Ox0 and was intubated, admitted to MICU for airway  monitoring and NAC treatment, now extubated s/p 9g NAC x2, charcoal 50g PO x2. INR1.38.  -Daily BMP to monitor electrolytes and replete as needed  -Trend LFTs, currently /  -Poison control and GI following

## 2021-08-19 NOTE — PROGRESS NOTE ADULT - ATTENDING COMMENTS
#Acetaminophen overdose  - Likely presented within 24h of OD, although do not know exact timing  - Presented to [facility] ~ 1720 8/16  - Tylenol lvl >300 @ 1800 8/16 --> 8 @ 500 8/18  - Initial AST 99, ALT 48, Alk Phos 60, Bili 0.9, INR ~1, Lactate 10 -> yesterday AST/ ALT uptrending to 496/166 INR 1.24, lactate 1.8, phos 2.8 -> AST// 206 INR1.38  - S/p activated charcoal  - Completed 20h NAC protocol (started 2032 8/16)     --9g IV over first 60min     --3g IV over 4 hrs     --6g IV over 16hrs  - Continue NAC 9g over next 24hrs  - Poison control notified, appreciate recommendations  - Anticipate LFTs to continue to rise as delayed response, within 72- 96hr  - [facility] criteria: 0   - Does not meet criteria for referral to liver transplant at this time  - Monitor LFTs, INR, pH, Cr, lactate, phos for re- assessment
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
## 2021-08-19 NOTE — PROGRESS NOTE ADULT - PROBLEM SELECTOR PLAN 3
15.8 on admission but trending down, pt now with hgb 11.9.   -Trend Hgb with daily CBC  -Iron studies AM  -Reticulocyte count AM Increased CK now downtrending, 2534 ---> 1377 --> 1044. No RBC in urine. S/p LR infusion 150mL/hr 12 hours.   -CK is downtrending, continue to monitor  -Can consider maintenance fluids if not resolving

## 2021-08-19 NOTE — PROGRESS NOTE ADULT - SUBJECTIVE AND OBJECTIVE BOX
GASTROENTEROLOGY PROGRESS NOTE  Patient seen and examined at bedside.  C/o some dizziness but able to eat breakfast.  On 72h NAC protocol       INR 1.34    PERTINENT REVIEW OF SYSTEMS:  CONSTITUTIONAL: No weakness, fevers or chills  HEENT: No visual changes; No vertigo or throat pain   GASTROINTESTINAL: As above.  NEUROLOGICAL: No numbness or weakness  SKIN: No itching, burning, rashes, or lesions     Allergies    Allergy Status Unknown    Intolerances      MEDICATIONS:  MEDICATIONS  (STANDING):  chlorhexidine 4% Liquid 1 Application(s) Topical <User Schedule>  dextrose 40% Gel 15 Gram(s) Oral once  dextrose 5%. 1000 milliLiter(s) (50 mL/Hr) IV Continuous <Continuous>  dextrose 5%. 1000 milliLiter(s) (100 mL/Hr) IV Continuous <Continuous>  dextrose 50% Injectable 25 Gram(s) IV Push once  dextrose 50% Injectable 12.5 Gram(s) IV Push once  dextrose 50% Injectable 25 Gram(s) IV Push once  enoxaparin Injectable 40 milliGRAM(s) SubCutaneous every 24 hours  glucagon  Injectable 1 milliGRAM(s) IntraMuscular once  insulin lispro (ADMELOG) corrective regimen sliding scale   SubCutaneous every 6 hours  pantoprazole  Injectable 40 milliGRAM(s) IV Push every 24 hours  potassium chloride   Powder 40 milliEquivalent(s) Oral every 2 hours  QUEtiapine 50 milliGRAM(s) Oral every 12 hours    MEDICATIONS  (PRN):  benzocaine 15 mG/menthol 3.6 mG Lozenge 1 Lozenge Oral two times a day PRN Sore Throat  QUEtiapine 50 milliGRAM(s) Oral every 6 hours PRN agitation    Vital Signs Last 24 Hrs  T(C): 36.5 (19 Aug 2021 09:00), Max: 37.7 (19 Aug 2021 05:00)  T(F): 97.7 (19 Aug 2021 09:00), Max: 99.9 (19 Aug 2021 05:00)  HR: 97 (19 Aug 2021 08:00) (63 - 108)  BP: 118/69 (19 Aug 2021 08:00) (101/59 - 143/99)  BP(mean): 88 (19 Aug 2021 08:00) (71 - 117)  RR: 25 (19 Aug 2021 08:00) (22 - 37)  SpO2: 94% (19 Aug 2021 08:00) (93% - 99%)    08-18 @ 07:01  -  08-19 @ 07:00  --------------------------------------------------------  IN: 1872 mL / OUT: 3350 mL / NET: -1478 mL    08-19 @ 07:01  -  08-19 @ 08:40  --------------------------------------------------------  IN: 43.5 mL / OUT: 0 mL / NET: 43.5 mL      PHYSICAL EXAM:    General: in no acute distress  HEENT: MMM, conjunctiva and sclera clear  Gastrointestinal: Soft non-tender non-distended; No rebound or guarding  Skin: Warm and dry. No obvious rash    LABS:                        11.9   13.41 )-----------( 196      ( 19 Aug 2021 05:36 )             34.4     08-19    138  |  103  |  4<L>  ----------------------------<  120<H>  3.1<L>   |  24  |  0.73    Ca    8.4      19 Aug 2021 05:36  Phos  2.1     08-19  Mg     1.6     08-19    TPro  5.8<L>  /  Alb  3.4  /  TBili  1.0  /  DBili  x   /  AST  379<H>  /  ALT  205<H>  /  AlkPhos  73  08-19    PT/INR - ( 19 Aug 2021 05:36 )   PT: 16.3 sec;   INR: 1.38          PTT - ( 19 Aug 2021 05:36 )  PTT:36.6 sec                  Culture - Blood (collected 17 Aug 2021 01:52)  Source: .Blood Blood  Preliminary Report (18 Aug 2021 02:02):    No growth to date.    Culture - Blood (collected 17 Aug 2021 01:52)  Source: .Blood Blood  Preliminary Report (18 Aug 2021 02:02):    No growth to date.      RADIOLOGY & ADDITIONAL STUDIES:  Reviewed

## 2021-08-19 NOTE — PROGRESS NOTE ADULT - PROBLEM SELECTOR PLAN 1
Hx of intentional acetaminophen, anticholinergic & alcohol OD. Pt was alert on EMS arrival but lost mental status en route to [facility], was A&Ox0 and was intubated, admitted to MICU for airway  monitoring and NAC treatment, now extubated s/p 9g NAC x2, charcoal 50g PO x2. INR1.38.  -Daily BMP to monitor electrolytes and replete as needed  -Trend LFTs, currently /  -Poison control and GI following ADDENDUM: improving, monitor mental status

## 2021-08-19 NOTE — PROGRESS NOTE ADULT - PROBLEM SELECTOR PLAN 10
Pt with intentional acetaminophen, anticholinergic & alcohol OD, with feelings of paranoia.   - Seroquel 100mg at bedtime  -Seroquel 50mg for agitation  -Psych following, appreciate recs

## 2021-08-19 NOTE — DIETITIAN INITIAL EVALUATION ADULT. - PERTINENT LABORATORY DATA
low HH  last 3 POCT 104 119 75  potassium 3.1  Glucose 120  AST SGOT 379  ALT SGPT 205  phosphorus 2.1   BUN 4, Cr WDL

## 2021-08-19 NOTE — PROGRESS NOTE ADULT - PROBLEM SELECTOR PLAN 9
F: None   E: Replete as necessary K>4 Mg>2  N: Normal diet  DVT Prophylaxis: Lovenox 40mg SQ q24h  GI prophylaxis: Protonix 40mg IVP q24h   CODE STATUS: FULL  Dispo: to floors ADDENDUM: reports drinking 4-5 glasses of wine daily, last drink last friday, no signs of active w/d advised cessation.

## 2021-08-19 NOTE — PROGRESS NOTE ADULT - PROBLEM SELECTOR PLAN 6
Pt with intentional acetaminophen, anticholinergic & alcohol OD, with feelings of paranoia.   - Seroquel 100mg at bedtime  -Seroquel 50mg for agitation  -Psych following, appreciate recs Admission EKG with voltage criteria for LVH. Currently no complaints of chest pain, tightness, or SOB.   -Consider Echocardiogram in AM to evaluate for left ventricular hypertrophy Hx of intentional acetaminophen, anticholinergic & alcohol OD. Now s/p NAC x2, charcoal 50g PO x2. INR 1.38, PT 16.3, PTT 36.6.  -Continue to trend LFT daily.   -GI and poison control following

## 2021-08-19 NOTE — DIETITIAN INITIAL EVALUATION ADULT. - ORAL NUTRITION SUPPLEMENTS
If PO does not improve in next 48hrs, recommend use of oral nutrition supplements - ensure enlive x1/day 350kcal/20gm prot

## 2021-08-19 NOTE — PROGRESS NOTE ADULT - PROBLEM SELECTOR PLAN 11
F: None   E: Replete as necessary K>4 Mg>2  N: Normal diet  DVT Prophylaxis: Lovenox 40mg SQ q24h  GI prophylaxis: Protonix 40mg IVP q24h   CODE STATUS: FULL  Dispo: to floors

## 2021-08-19 NOTE — PROGRESS NOTE ADULT - PROBLEM SELECTOR PLAN 8
F: None   E: Replete as necessary K>4 Mg>2  N: Normal diet  DVT Prophylaxis: Lovenox 40mg SQ q24h  GI prophylaxis: Protonix 40mg IVP q24h   CODE STATUS: FULL  Dispo: to floors Pt with intentional acetaminophen, anticholinergic & alcohol OD, with feelings of paranoia.   - Seroquel 100mg at bedtime  -Seroquel 50mg for agitation  -Psych following, appreciate recs On admission WBC of 7.70, but continuing to trend up during stay, now at 13.41 with 87.6% neutrophil predominance. No hx of fever during admission and pt with no complaints of fevers, chills, nausea, vomiting.   -Continue to trend WBC with daily CBC  -If persistent, consider further workup for leukocytosis

## 2021-08-19 NOTE — PROGRESS NOTE ADULT - ASSESSMENT
25M BIB EMS d/t AMS from intentional OD of Tylenol, Alcohol, Unisom (doxylamine), & Benadryl of unknown quantity. Found to have mild transaminitis with elevated serum acetaminophen, alcohol & positive urine amphetamines. Admitted to MICU for airway protection, NAC infusion, monitoring of mental status & liver function. Poison control following. GI consulted for Tylenol overdose.    #Acetaminophen overdose  - Likely presented within 24h of OD, although do not know exact timing  - Presented to [facility] ~ 1720 8/16  - Tylenol lvl >300 @ 1800 8/16 --> 8 @ 500 8/18  - Initial AST 99, ALT 48, Alk Phos 60, Bili 0.9, INR ~1, Lactate 10 -> yesterday AST/ ALT uptrending to 496/166 INR 1.24, lactate 1.8, phos 2.8 -> AST// 206 INR1.38  - S/p activated charcoal  - Completed 20h NAC protocol (started 2032 8/16)     --9g IV over first 60min     --3g IV over 4 hrs     --6g IV over 16hrs  - Continue NAC 9g over next 24hrs  - Poison control notified, appreciate recommendations  - Anticipate LFTs to continue to rise as delayed response, within 72- 96hr  - [employer] criteria: 0   - Does not meet criteria for referral to liver transplant at this time  - Monitor LFTs, INR, pH, Cr, lactate, phos for re- assessment    Lupe Saleh MD  Gastroenterology Fellow, PGY -4   Pager [phone]  [phone]

## 2021-08-19 NOTE — PROGRESS NOTE ADULT - PROBLEM SELECTOR PLAN 7
F: None   E: Replete as necessary K>4 Mg>2  N: Normal diet  DVT Prophylaxis: Lovenox 40mg SQ q24h  GI prophylaxis: Protonix 40mg IVP q24h   CODE STATUS: FULL  Dispo: to floors Pt with intentional acetaminophen, anticholinergic & alcohol OD, with feelings of paranoia.   - Seroquel 100mg at bedtime  -Seroquel 50mg for agitation  -Psych following, appreciate recs On admission WBC of 7.70, but continuing to trend up during stay, now at 13.41 with 87.6% neutrophil predominance. No hx of fever during admission and pt with no complaints of fevers, chills, nausea, vomiting.   -Continue to trend WBC with daily CBC On admission WBC of 7.70, but continuing to trend up during stay, now at 13.41 with 87.6% neutrophil predominance. No hx of fever during admission and pt with no complaints of fevers, chills, nausea, vomiting.   -Continue to trend WBC with daily CBC  -If persistent, consider further workup for leukocytosis Admission EKG with voltage criteria for LVH. Currently no complaints of chest pain, tightness, or SOB.   -Consider Echocardiogram in AM to evaluate for left ventricular hypertrophy

## 2021-08-19 NOTE — PROGRESS NOTE ADULT - PROBLEM SELECTOR PLAN 4
Initially with K 2.7 trending upwards to 4.0, but now 3.1, s/p 10mEq x5 IV & 40mEq via NGT, s/p 1g calcium gluconate IVP for cardiac protection.  -Daily BMP to trend potassium  -Replete as needed 15.8 on admission but trending down, pt now with hgb 11.9.   -Trend Hgb with daily CBC  -Iron studies AM  -Reticulocyte count AM

## 2021-08-19 NOTE — PROGRESS NOTE ADULT - ATTENDING COMMENTS
reviewed pertinent data , h&p  patient seen and examined overnight     tired appearing, cooperative, wishes to go home, CIWA is 0; on NAC; rest of  PE as above    1. TME: resolving   2. hepatotoxicity: monitor LFTs, completing NAC protocol; followup gi and poison control recs; followup psych recs for psych admission   3. monitor WBC, likely reactive , monitor for signs of infection       rest of  plan as above

## 2021-08-19 NOTE — PROGRESS NOTE ADULT - SUBJECTIVE AND OBJECTIVE BOX
OVERNIGHT EVENTS: Pt had myalgias and received 400 of ibuprofen.    SUBJECTIVE / INTERVAL HPI: Patient c/o nausea w/o vomiting, heartburn and lightheadedness. Patient has anxiety because the password to all of his accounts have been changed and he cannot log into them. No chest pain, abdominal pain, burning with urination, SOB.     VITAL SIGNS:  Vital Signs Last 24 Hrs  T(C): 36.5 (19 Aug 2021 09:00), Max: 37.7 (19 Aug 2021 05:00)  T(F): 97.7 (19 Aug 2021 09:00), Max: 99.9 (19 Aug 2021 05:00)  HR: 90 (19 Aug 2021 10:00) (63 - 108)  BP: 113/76 (19 Aug 2021 10:00) (101/59 - 125/80)  BP(mean): 91 (19 Aug 2021 10:00) (71 - 97)  RR: 19 (19 Aug 2021 10:00) (19 - 37)  SpO2: 98% (19 Aug 2021 10:00) (94% - 98%)  I&O's Summary    18 Aug 2021 07:01  -  19 Aug 2021 07:00  --------------------------------------------------------  IN: 1872 mL / OUT: 3350 mL / NET: -1478 mL    19 Aug 2021 07:01  -  19 Aug 2021 11:17  --------------------------------------------------------  IN: 87 mL / OUT: 450 mL / NET: -363 mL        PHYSICAL EXAM:    General: In no acute distress  HEENT: NC/AT; PERRL, anicteric sclera; MMM  Neck: supple  Cardiovascular: +S1/S2; RRR  Respiratory: CTA B/L; no W/R/R  Gastrointestinal: soft, NT/ND; +BSx4  Extremities: WWP; no edema, clubbing or cyanosis  Vascular: 2+ radial, DP/PT pulses B/L  Neurological: AAOx3; no focal deficits    MEDICATIONS:  MEDICATIONS  (STANDING):  chlorhexidine 4% Liquid 1 Application(s) Topical <User Schedule>  dextrose 40% Gel 15 Gram(s) Oral once  dextrose 5%. 1000 milliLiter(s) (50 mL/Hr) IV Continuous <Continuous>  dextrose 5%. 1000 milliLiter(s) (100 mL/Hr) IV Continuous <Continuous>  dextrose 50% Injectable 25 Gram(s) IV Push once  dextrose 50% Injectable 12.5 Gram(s) IV Push once  dextrose 50% Injectable 25 Gram(s) IV Push once  enoxaparin Injectable 40 milliGRAM(s) SubCutaneous every 24 hours  glucagon  Injectable 1 milliGRAM(s) IntraMuscular once  insulin lispro (ADMELOG) corrective regimen sliding scale   SubCutaneous every 6 hours  pantoprazole  Injectable 40 milliGRAM(s) IV Push every 24 hours  QUEtiapine 50 milliGRAM(s) Oral every 12 hours    MEDICATIONS  (PRN):  benzocaine 15 mG/menthol 3.6 mG Lozenge 1 Lozenge Oral two times a day PRN Sore Throat  QUEtiapine 50 milliGRAM(s) Oral every 6 hours PRN agitation      ALLERGIES:  Allergies    Allergy Status Unknown    Intolerances        LABS:                        11.9   13.41 )-----------( 196      ( 19 Aug 2021 05:36 )             34.4     08-19    138  |  103  |  4<L>  ----------------------------<  120<H>  3.1<L>   |  24  |  0.73    Ca    8.4      19 Aug 2021 05:36  Phos  2.1     08-19  Mg     1.6     08-19    TPro  5.8<L>  /  Alb  3.4  /  TBili  1.0  /  DBili  x   /  AST  379<H>  /  ALT  205<H>  /  AlkPhos  73  08-19    PT/INR - ( 19 Aug 2021 05:36 )   PT: 16.3 sec;   INR: 1.38          PTT - ( 19 Aug 2021 05:36 )  PTT:36.6 sec    CAPILLARY BLOOD GLUCOSE      POCT Blood Glucose.: 104 mg/dL (19 Aug 2021 05:37)      RADIOLOGY & ADDITIONAL TESTS: Reviewed.   Hospital Course  25M BIB EMS d/t AMS from intentional alcohol & drug OD of Unisom (doxylamine), Tylenol, & Benadryl of unknown quantity. Found to have mild transaminitis with elevated serum acetaminophen, alcohol & positive urine amphetamines. Admitted to MICU for airway protection, NAC infusion, monitoring of mental status & liver function. Extubated 8/18. LFT's, CK, all downtrending. Psych following for SI, paranoia and substance use.      OVERNIGHT EVENTS: Pt had myalgias and received 400 of ibuprofen.    SUBJECTIVE / INTERVAL HPI: Patient c/o nausea w/o vomiting, heartburn and lightheadedness. Patient has anxiety because the password to all of his accounts have been changed and he cannot log into them. No chest pain, abdominal pain, burning with urination, SOB.     VITAL SIGNS:  Vital Signs Last 24 Hrs  T(C): 36.5 (19 Aug 2021 09:00), Max: 37.7 (19 Aug 2021 05:00)  T(F): 97.7 (19 Aug 2021 09:00), Max: 99.9 (19 Aug 2021 05:00)  HR: 90 (19 Aug 2021 10:00) (63 - 108)  BP: 113/76 (19 Aug 2021 10:00) (101/59 - 125/80)  BP(mean): 91 (19 Aug 2021 10:00) (71 - 97)  RR: 19 (19 Aug 2021 10:00) (19 - 37)  SpO2: 98% (19 Aug 2021 10:00) (94% - 98%)  I&O's Summary    18 Aug 2021 07:01  -  19 Aug 2021 07:00  --------------------------------------------------------  IN: 1872 mL / OUT: 3350 mL / NET: -1478 mL    19 Aug 2021 07:01  -  19 Aug 2021 11:17  --------------------------------------------------------  IN: 87 mL / OUT: 450 mL / NET: -363 mL        PHYSICAL EXAM:    General: In no acute distress  HEENT: NC/AT; PERRL, anicteric sclera; MMM  Neck: supple  Cardiovascular: +S1/S2; RRR  Respiratory: CTA B/L; no W/R/R  Gastrointestinal: soft, NT/ND; +BSx4  Extremities: WWP; no edema, clubbing or cyanosis  Vascular: 2+ radial, DP/PT pulses B/L  Neurological: AAOx3; no focal deficits    MEDICATIONS:  MEDICATIONS  (STANDING):  chlorhexidine 4% Liquid 1 Application(s) Topical <User Schedule>  dextrose 40% Gel 15 Gram(s) Oral once  dextrose 5%. 1000 milliLiter(s) (50 mL/Hr) IV Continuous <Continuous>  dextrose 5%. 1000 milliLiter(s) (100 mL/Hr) IV Continuous <Continuous>  dextrose 50% Injectable 25 Gram(s) IV Push once  dextrose 50% Injectable 12.5 Gram(s) IV Push once  dextrose 50% Injectable 25 Gram(s) IV Push once  enoxaparin Injectable 40 milliGRAM(s) SubCutaneous every 24 hours  glucagon  Injectable 1 milliGRAM(s) IntraMuscular once  insulin lispro (ADMELOG) corrective regimen sliding scale   SubCutaneous every 6 hours  pantoprazole  Injectable 40 milliGRAM(s) IV Push every 24 hours  QUEtiapine 50 milliGRAM(s) Oral every 12 hours    MEDICATIONS  (PRN):  benzocaine 15 mG/menthol 3.6 mG Lozenge 1 Lozenge Oral two times a day PRN Sore Throat  QUEtiapine 50 milliGRAM(s) Oral every 6 hours PRN agitation      ALLERGIES:  Allergies    Allergy Status Unknown    Intolerances        LABS:                        11.9   13.41 )-----------( 196      ( 19 Aug 2021 05:36 )             34.4     08-19    138  |  103  |  4<L>  ----------------------------<  120<H>  3.1<L>   |  24  |  0.73    Ca    8.4      19 Aug 2021 05:36  Phos  2.1     08-19  Mg     1.6     08-19    TPro  5.8<L>  /  Alb  3.4  /  TBili  1.0  /  DBili  x   /  AST  379<H>  /  ALT  205<H>  /  AlkPhos  73  08-19    PT/INR - ( 19 Aug 2021 05:36 )   PT: 16.3 sec;   INR: 1.38          PTT - ( 19 Aug 2021 05:36 )  PTT:36.6 sec    CAPILLARY BLOOD GLUCOSE      POCT Blood Glucose.: 104 mg/dL (19 Aug 2021 05:37)      RADIOLOGY & ADDITIONAL TESTS: Reviewed.   Hospital Course  25M BIB EMS d/t AMS from intentional alcohol & drug OD of Unisom (doxylamine) Tylenol, & Benadryl of unknown quantity while on methamphetamines. Found to have mild transaminitis with elevated serum acetaminophen, alcohol & positive urine amphetamines. Intubated due to inability to protect airway. No signs of anticholinergic toxicity.  Admitted to MICU for airway protection, NAC infusion, monitoring of mental status & liver function. Received 9g of NAC.  Extubated 8/18. LFT's, CK, all downtrending. Psych following for SI, paranoia and substance use.    OVERNIGHT EVENTS: Pt had myalgias and received 400 of ibuprofen.    SUBJECTIVE / INTERVAL HPI: Patient c/o nausea w/o vomiting, heartburn and lightheadedness. Patient has anxiety because the password to all of his accounts have been changed and he cannot log into them. No chest pain, abdominal pain, burning with urination, SOB.     VITAL SIGNS:  Vital Signs Last 24 Hrs  T(C): 36.5 (19 Aug 2021 09:00), Max: 37.7 (19 Aug 2021 05:00)  T(F): 97.7 (19 Aug 2021 09:00), Max: 99.9 (19 Aug 2021 05:00)  HR: 90 (19 Aug 2021 10:00) (63 - 108)  BP: 113/76 (19 Aug 2021 10:00) (101/59 - 125/80)  BP(mean): 91 (19 Aug 2021 10:00) (71 - 97)  RR: 19 (19 Aug 2021 10:00) (19 - 37)  SpO2: 98% (19 Aug 2021 10:00) (94% - 98%)  I&O's Summary    18 Aug 2021 07:01  -  19 Aug 2021 07:00  --------------------------------------------------------  IN: 1872 mL / OUT: 3350 mL / NET: -1478 mL    19 Aug 2021 07:01  -  19 Aug 2021 11:17  --------------------------------------------------------  IN: 87 mL / OUT: 450 mL / NET: -363 mL        PHYSICAL EXAM:    General: In no acute distress  HEENT: NC/AT; PERRL, anicteric sclera; MMM  Neck: supple  Cardiovascular: +S1/S2; RRR  Respiratory: CTA B/L; no W/R/R  Gastrointestinal: soft, NT/ND; +BSx4  Extremities: WWP; no edema, clubbing or cyanosis  Vascular: 2+ radial, DP/PT pulses B/L  Neurological: AAOx3; no focal deficits    MEDICATIONS:  MEDICATIONS  (STANDING):  chlorhexidine 4% Liquid 1 Application(s) Topical <User Schedule>  dextrose 40% Gel 15 Gram(s) Oral once  dextrose 5%. 1000 milliLiter(s) (50 mL/Hr) IV Continuous <Continuous>  dextrose 5%. 1000 milliLiter(s) (100 mL/Hr) IV Continuous <Continuous>  dextrose 50% Injectable 25 Gram(s) IV Push once  dextrose 50% Injectable 12.5 Gram(s) IV Push once  dextrose 50% Injectable 25 Gram(s) IV Push once  enoxaparin Injectable 40 milliGRAM(s) SubCutaneous every 24 hours  glucagon  Injectable 1 milliGRAM(s) IntraMuscular once  insulin lispro (ADMELOG) corrective regimen sliding scale   SubCutaneous every 6 hours  pantoprazole  Injectable 40 milliGRAM(s) IV Push every 24 hours  QUEtiapine 50 milliGRAM(s) Oral every 12 hours    MEDICATIONS  (PRN):  benzocaine 15 mG/menthol 3.6 mG Lozenge 1 Lozenge Oral two times a day PRN Sore Throat  QUEtiapine 50 milliGRAM(s) Oral every 6 hours PRN agitation      ALLERGIES:  Allergies    Allergy Status Unknown    Intolerances        LABS:                        11.9   13.41 )-----------( 196      ( 19 Aug 2021 05:36 )             34.4     08-19    138  |  103  |  4<L>  ----------------------------<  120<H>  3.1<L>   |  24  |  0.73    Ca    8.4      19 Aug 2021 05:36  Phos  2.1     08-19  Mg     1.6     08-19    TPro  5.8<L>  /  Alb  3.4  /  TBili  1.0  /  DBili  x   /  AST  379<H>  /  ALT  205<H>  /  AlkPhos  73  08-19    PT/INR - ( 19 Aug 2021 05:36 )   PT: 16.3 sec;   INR: 1.38          PTT - ( 19 Aug 2021 05:36 )  PTT:36.6 sec    CAPILLARY BLOOD GLUCOSE      POCT Blood Glucose.: 104 mg/dL (19 Aug 2021 05:37)      RADIOLOGY & ADDITIONAL TESTS: Reviewed.   Hospital Course - TRANSFER TO FLOOR  25M BIB EMS d/t AMS from intentional alcohol & drug OD of Unisom (doxylamine) Tylenol, & Benadryl of unknown quantity while on methamphetamines. Found to have mild transaminitis with elevated serum acetaminophen, alcohol & positive urine amphetamines. Intubated and given calcium gluconate. No signs of anticholinergic toxicity. Admitted to MICU for airway protection, NAC infusion, monitoring of mental status & liver function. GI and poison control consulted. Extubated 8/18. Received 2nd NAC infusion on 8/18 due to increasing LFT's. Pt endorses 5 alcoholic drinks 5 times a week. Not showing any signs of withdrawal. LFT's, CK, all now downtrending. Psych following for SI, paranoia and substance use. Started on Seroquel for paranoia.    OVERNIGHT EVENTS: Pt had myalgias and received 400 of ibuprofen.    SUBJECTIVE / INTERVAL HPI: Patient c/o nausea w/o vomiting, heartburn and lightheadedness. Patient has anxiety because the password to all of his accounts have been changed and he cannot log into them. No chest pain, abdominal pain, burning with urination, SOB.     VITAL SIGNS:  Vital Signs Last 24 Hrs  T(C): 36.5 (19 Aug 2021 09:00), Max: 37.7 (19 Aug 2021 05:00)  T(F): 97.7 (19 Aug 2021 09:00), Max: 99.9 (19 Aug 2021 05:00)  HR: 90 (19 Aug 2021 10:00) (63 - 108)  BP: 113/76 (19 Aug 2021 10:00) (101/59 - 125/80)  BP(mean): 91 (19 Aug 2021 10:00) (71 - 97)  RR: 19 (19 Aug 2021 10:00) (19 - 37)  SpO2: 98% (19 Aug 2021 10:00) (94% - 98%)  I&O's Summary    18 Aug 2021 07:01  -  19 Aug 2021 07:00  --------------------------------------------------------  IN: 1872 mL / OUT: 3350 mL / NET: -1478 mL    19 Aug 2021 07:01  -  19 Aug 2021 11:17  --------------------------------------------------------  IN: 87 mL / OUT: 450 mL / NET: -363 mL        PHYSICAL EXAM:    General: In no acute distress  HEENT: NC/AT; PERRL, anicteric sclera; MMM  Neck: supple  Cardiovascular: +S1/S2; RRR  Respiratory: CTA B/L; no W/R/R  Gastrointestinal: soft, NT/ND; +BSx4  Extremities: WWP; no edema, clubbing or cyanosis  Vascular: 2+ radial, DP/PT pulses B/L  Neurological: AAOx3; no focal deficits    MEDICATIONS:  MEDICATIONS  (STANDING):  chlorhexidine 4% Liquid 1 Application(s) Topical <User Schedule>  dextrose 40% Gel 15 Gram(s) Oral once  dextrose 5%. 1000 milliLiter(s) (50 mL/Hr) IV Continuous <Continuous>  dextrose 5%. 1000 milliLiter(s) (100 mL/Hr) IV Continuous <Continuous>  dextrose 50% Injectable 25 Gram(s) IV Push once  dextrose 50% Injectable 12.5 Gram(s) IV Push once  dextrose 50% Injectable 25 Gram(s) IV Push once  enoxaparin Injectable 40 milliGRAM(s) SubCutaneous every 24 hours  glucagon  Injectable 1 milliGRAM(s) IntraMuscular once  insulin lispro (ADMELOG) corrective regimen sliding scale   SubCutaneous every 6 hours  pantoprazole  Injectable 40 milliGRAM(s) IV Push every 24 hours  QUEtiapine 50 milliGRAM(s) Oral every 12 hours    MEDICATIONS  (PRN):  benzocaine 15 mG/menthol 3.6 mG Lozenge 1 Lozenge Oral two times a day PRN Sore Throat  QUEtiapine 50 milliGRAM(s) Oral every 6 hours PRN agitation      ALLERGIES:  Allergies    Allergy Status Unknown    Intolerances        LABS:                        11.9   13.41 )-----------( 196      ( 19 Aug 2021 05:36 )             34.4     08-19    138  |  103  |  4<L>  ----------------------------<  120<H>  3.1<L>   |  24  |  0.73    Ca    8.4      19 Aug 2021 05:36  Phos  2.1     08-19  Mg     1.6     08-19    TPro  5.8<L>  /  Alb  3.4  /  TBili  1.0  /  DBili  x   /  AST  379<H>  /  ALT  205<H>  /  AlkPhos  73  08-19    PT/INR - ( 19 Aug 2021 05:36 )   PT: 16.3 sec;   INR: 1.38          PTT - ( 19 Aug 2021 05:36 )  PTT:36.6 sec    CAPILLARY BLOOD GLUCOSE      POCT Blood Glucose.: 104 mg/dL (19 Aug 2021 05:37)      RADIOLOGY & ADDITIONAL TESTS: Reviewed.

## 2021-08-19 NOTE — PROGRESS NOTE ADULT - ATTENDING COMMENTS
Intentional overdose with Tylenol, Benadryl, Doxylamine and EtOH with acute hypoxemic respiratory failure. Has rhabdo which is resolving. Tylenol level w/i normal; s/p NAC; LFTs beginning to trend down. Had QTC prolongation now resolved. Doing well post extubation. Psych on board. Floor eligible.

## 2021-08-19 NOTE — PROGRESS NOTE ADULT - ASSESSMENT
25M BIB EMS d/t AMS from intentional alcohol & drug OD of Unisom (doxylamine), Tylenol, & Benadryl of unknown quantity, intubated and admitted to MICU for airway protection, NAC infusion, and further monitoring, now extubated with LFT's, CK, all downtrending, now being transferred to [facility] for further monitoring.

## 2021-08-19 NOTE — DIETITIAN INITIAL EVALUATION ADULT. - OTHER INFO
25M BIB EMS d/t AMS from intentional alcohol/drug OD of Unisom (doxylamine) Tylenol, & Benadryl of unknown quantity. Found to have mild transaminitis with elevated serum acetaminophen, alcohol and positive urine amphetamines. Admitted to MICU for airway protection, NAC infusion, monitoring of mental status, liver function. Extubated 8/18. Remains in CCU under MICU for treatment of Metabolic encephalopathy likely 2/2 hepatotoxicity, electrolyte abnormalities. Psych following for SI, paranoia and substance use.      Pt visited, resting in bed. Reports consuming ~25% of breakfast this AM 2/2 lack of appetite, reports decreased PO x1-2days PTA otherwise normal/good appetite. NKFA. Denies issues chewing/swallowing, pt does report sore throat s/p Extubation and asking for softer foods at this time, only able to report wanting to eat jello presently. Ordered for Cepacol sore throat. Pt denies wt changes,  pounds per pt however admit wt 130 pounds noted, 14 pound wt difference. Declined NFPE, will cont to monitor wts. +Head Back and Neck pain. Bartolo 18. No edema/pressure ulcers noted at this time. GI, soft NT.   Please see below for nutritions recommendations.

## 2021-08-19 NOTE — PROGRESS NOTE ADULT - PROBLEM SELECTOR PLAN 5
Hx of intentional acetaminophen, anticholinergic & alcohol OD. Now s/p NAC x2, charcoal 50g PO x2. INR 1.38, PT 16.3, PTT 36.6.  -Continue to trend LFT daily.   -GI and poison control following Initially with K 2.7 trending upwards to 4.0, but now 3.1, s/p 10mEq x5 IV & 40mEq via NGT, s/p 1g calcium gluconate IVP for cardiac protection.  -Daily BMP to trend potassium  -Replete as needed

## 2021-08-20 ENCOUNTER — TRANSCRIPTION ENCOUNTER (OUTPATIENT)
Age: 26
End: 2021-08-20

## 2021-08-20 DIAGNOSIS — R63.8 OTHER SYMPTOMS AND SIGNS CONCERNING FOOD AND FLUID INTAKE: ICD-10-CM

## 2021-08-20 DIAGNOSIS — I51.7 CARDIOMEGALY: ICD-10-CM

## 2021-08-20 DIAGNOSIS — T44.3X1A POISONING BY OTHER PARASYMPATHOLYTICS [ANTICHOLINERGICS AND ANTIMUSCARINICS] AND SPASMOLYTICS, ACCIDENTAL (UNINTENTIONAL), INITIAL ENCOUNTER: ICD-10-CM

## 2021-08-20 DIAGNOSIS — G92 TOXIC ENCEPHALOPATHY: ICD-10-CM

## 2021-08-20 DIAGNOSIS — M62.82 RHABDOMYOLYSIS: ICD-10-CM

## 2021-08-20 DIAGNOSIS — D64.9 ANEMIA, UNSPECIFIED: ICD-10-CM

## 2021-08-20 DIAGNOSIS — D72.829 ELEVATED WHITE BLOOD CELL COUNT, UNSPECIFIED: ICD-10-CM

## 2021-08-20 DIAGNOSIS — K71.9 TOXIC LIVER DISEASE, UNSPECIFIED: ICD-10-CM

## 2021-08-20 DIAGNOSIS — F10.10 ALCOHOL ABUSE, UNCOMPLICATED: ICD-10-CM

## 2021-08-20 DIAGNOSIS — F22 DELUSIONAL DISORDERS: ICD-10-CM

## 2021-08-20 DIAGNOSIS — R74.01 ELEVATION OF LEVELS OF LIVER TRANSAMINASE LEVELS: ICD-10-CM

## 2021-08-20 LAB
ALBUMIN SERPL ELPH-MCNC: 3.4 G/DL — SIGNIFICANT CHANGE UP (ref 3.3–5)
ALP SERPL-CCNC: 75 U/L — SIGNIFICANT CHANGE UP (ref 40–120)
ALT FLD-CCNC: 515 U/L — HIGH (ref 10–45)
ANION GAP SERPL CALC-SCNC: 8 MMOL/L — SIGNIFICANT CHANGE UP (ref 5–17)
AST SERPL-CCNC: 950 U/L — HIGH (ref 10–40)
BILIRUB SERPL-MCNC: 0.8 MG/DL — SIGNIFICANT CHANGE UP (ref 0.2–1.2)
BUN SERPL-MCNC: 3 MG/DL — LOW (ref 7–23)
C TRACH RRNA SPEC QL NAA+PROBE: SIGNIFICANT CHANGE UP
C TRACH RRNA SPEC QL NAA+PROBE: SIGNIFICANT CHANGE UP
CALCIUM SERPL-MCNC: 8.9 MG/DL — SIGNIFICANT CHANGE UP (ref 8.4–10.5)
CHLORIDE SERPL-SCNC: 103 MMOL/L — SIGNIFICANT CHANGE UP (ref 96–108)
CK SERPL-CCNC: 224 U/L — HIGH (ref 30–200)
CO2 SERPL-SCNC: 25 MMOL/L — SIGNIFICANT CHANGE UP (ref 22–31)
CREAT SERPL-MCNC: 0.62 MG/DL — SIGNIFICANT CHANGE UP (ref 0.5–1.3)
DRUG SCREEN, SERUM: SIGNIFICANT CHANGE UP
FERRITIN SERPL-MCNC: 4092 NG/ML — HIGH (ref 30–400)
GLUCOSE SERPL-MCNC: 128 MG/DL — HIGH (ref 70–99)
HCT VFR BLD CALC: 36.1 % — LOW (ref 39–50)
HGB BLD-MCNC: 12.4 G/DL — LOW (ref 13–17)
IRON SATN MFR SERPL: 41 % — SIGNIFICANT CHANGE UP (ref 16–55)
IRON SATN MFR SERPL: 77 UG/DL — SIGNIFICANT CHANGE UP (ref 45–165)
MAGNESIUM SERPL-MCNC: 1.7 MG/DL — SIGNIFICANT CHANGE UP (ref 1.6–2.6)
MCHC RBC-ENTMCNC: 33 PG — SIGNIFICANT CHANGE UP (ref 27–34)
MCHC RBC-ENTMCNC: 34.3 GM/DL — SIGNIFICANT CHANGE UP (ref 32–36)
MCV RBC AUTO: 96 FL — SIGNIFICANT CHANGE UP (ref 80–100)
N GONORRHOEA RRNA SPEC QL NAA+PROBE: SIGNIFICANT CHANGE UP
N GONORRHOEA RRNA SPEC QL NAA+PROBE: SIGNIFICANT CHANGE UP
NRBC # BLD: 0 /100 WBCS — SIGNIFICANT CHANGE UP (ref 0–0)
PLATELET # BLD AUTO: 188 K/UL — SIGNIFICANT CHANGE UP (ref 150–400)
POTASSIUM SERPL-MCNC: 3.6 MMOL/L — SIGNIFICANT CHANGE UP (ref 3.5–5.3)
POTASSIUM SERPL-SCNC: 3.6 MMOL/L — SIGNIFICANT CHANGE UP (ref 3.5–5.3)
PROT SERPL-MCNC: 6.4 G/DL — SIGNIFICANT CHANGE UP (ref 6–8.3)
RBC # BLD: 3.76 M/UL — LOW (ref 4.2–5.8)
RBC # FLD: 12.1 % — SIGNIFICANT CHANGE UP (ref 10.3–14.5)
RPR SER-TITR: (no result)
RPR SERPL-ACNC: REACTIVE
SODIUM SERPL-SCNC: 136 MMOL/L — SIGNIFICANT CHANGE UP (ref 135–145)
SPECIMEN SOURCE: SIGNIFICANT CHANGE UP
SPECIMEN SOURCE: SIGNIFICANT CHANGE UP
T PALLIDUM AB TITR SER: POSITIVE
TIBC SERPL-MCNC: 186 UG/DL — LOW (ref 220–430)
TRANSFERRIN SERPL-MCNC: 144 MG/DL — LOW (ref 200–360)
UIBC SERPL-MCNC: 109 UG/DL — LOW (ref 110–370)
WBC # BLD: 8.31 K/UL — SIGNIFICANT CHANGE UP (ref 3.8–10.5)
WBC # FLD AUTO: 8.31 K/UL — SIGNIFICANT CHANGE UP (ref 3.8–10.5)

## 2021-08-20 PROCEDURE — 99232 SBSQ HOSP IP/OBS MODERATE 35: CPT

## 2021-08-20 PROCEDURE — 99233 SBSQ HOSP IP/OBS HIGH 50: CPT

## 2021-08-20 PROCEDURE — 93010 ELECTROCARDIOGRAM REPORT: CPT

## 2021-08-20 RX ORDER — ACETYLCYSTEINE 200 MG/ML
9 VIAL (ML) MISCELLANEOUS ONCE
Refills: 0 | Status: COMPLETED | OUTPATIENT
Start: 2021-08-20 | End: 2021-08-20

## 2021-08-20 RX ORDER — THIAMINE MONONITRATE (VIT B1) 100 MG
100 TABLET ORAL DAILY
Refills: 0 | Status: DISCONTINUED | OUTPATIENT
Start: 2021-08-20 | End: 2021-08-23

## 2021-08-20 RX ADMIN — BENZOCAINE AND MENTHOL 1 LOZENGE: 5; 1 LIQUID ORAL at 12:13

## 2021-08-20 RX ADMIN — Medication 43.54 GRAM(S): at 19:38

## 2021-08-20 RX ADMIN — BENZOCAINE AND MENTHOL 1 LOZENGE: 5; 1 LIQUID ORAL at 21:52

## 2021-08-20 RX ADMIN — QUETIAPINE FUMARATE 100 MILLIGRAM(S): 200 TABLET, FILM COATED ORAL at 21:52

## 2021-08-20 RX ADMIN — PANTOPRAZOLE SODIUM 40 MILLIGRAM(S): 20 TABLET, DELAYED RELEASE ORAL at 21:51

## 2021-08-20 RX ADMIN — ENOXAPARIN SODIUM 40 MILLIGRAM(S): 100 INJECTION SUBCUTANEOUS at 22:06

## 2021-08-20 NOTE — PROGRESS NOTE ADULT - SUBJECTIVE AND OBJECTIVE BOX
Patient was seen and examined by me at bedside. I agree with resident's note, subjective, objective physical exam, assessment and plan with following modifications/additions.    Greater than 35 minutes spent on total encounter; more than 50% of the visit was spent counseling and/or coordinating care by the attending physician.    25M with hx of alcohol abuse (no sig withdrawal hx) admitted to ICU for intubation s/p medication overdose in setting of crystal meth use, now extubated, on NAC infusion, on floor for further medical monitoring of hepatitis  #Transaminitis 2/2 tylenol overdose- appreciate GI recs, monitor LFTs (uptrending, expected per GI), continue on 72 hours NAC protocol, our team in contact with poison control center re other meds ingested, will also send full hep screen (only c done, neg)  #Drug induced psychosis- no need for one to one per psych, on seroquel per psych     DVT px- lovenox  Dispo- home pending LFT stability, possibly end of the wknd    Bob Rivas MD [phone]  Patient was seen and examined by me at bedside. I agree with resident's note, subjective, objective physical exam, assessment and plan with following modifications/additions.    Greater than 35 minutes spent on total encounter; more than 50% of the visit was spent counseling and/or coordinating care by the attending physician.    25M with hx of alcohol abuse (no sig withdrawal hx) admitted to ICU for intubation s/p medication overdose in setting of crystal meth use, now extubated, on NAC infusion, on floor for further medical monitoring of hepatitis  #Transaminitis 2/2 tylenol overdose- appreciate GI recs, monitor LFTs (uptrending, expected per GI), continue on 72 hours NAC protocol, our team in contact with poison control center re other meds ingested, will also send full hep screen (only c done, neg)  #Drug induced psychosis- no need for one to one per psych, on seroquel per psych   #LVH on initial EKG, low concern for HOCM- will f/u repeat EKG    DVT px- lovenox  Dispo- home pending LFT stability, possibly end of the wknd    Bob Rivas MD [phone]  Patient was seen and examined by me at bedside. I agree with resident's note, subjective, objective physical exam, assessment and plan with following modifications/additions.    Greater than 35 minutes spent on total encounter; more than 50% of the visit was spent counseling and/or coordinating care by the attending physician.    25M with hx of alcohol abuse (no sig withdrawal hx) admitted to ICU for intubation s/p medication overdose in setting of crystal meth use, now extubated, on NAC infusion, on floor for further medical monitoring of hepatitis  #Transaminitis 2/2 tylenol overdose- appreciate GI recs, monitor LFTs (uptrending, expected per GI), continue on 72 hours NAC protocol, our team in contact with poison control center re other meds ingested, will also send full hep screen (only c done, neg)  #Drug induced psychosis- no need for one to one per psych, on seroquel per psych   #Alcohol abuse, no withdrawal hx and stable off alcohol for 10 days- monitor closely, thiamine/MVI  #LVH on initial EKG, low concern for HOCM- will f/u repeat EKG    DVT px- lovenox  Dispo- home pending LFT stability, possibly end of the wknd    Bob Rivas MD [phone]

## 2021-08-20 NOTE — BH CONSULTATION LIAISON PROGRESS NOTE - NSBHASSESSMENTFT_PSY_ALL_CORE
24 y/o man with no prior PPH, and history of alcohol abuse, presenting to [facility] s/p OD after the patient became psychotic secondary to abusing crystal meth and alcohol. Patient initially presented s/p extubation with some confusion and persistent paranoia about being arrested for using drugs (r/o delirium vs substance induced psychosis). Collaterals from friends showed prior history of impulsivity and reckless behavior but no prior psychosis or suicidality.  Today he presents at baseline, with stable mood, no PI/AVH. He denies any SI/HI and presents future oriented. No racing thoughts, sleep much improved  PLan:    -continue seroquel to 100mg po qhs for psychosis and insomnia; please provide the pt with a 1 week prescription for the seroquel upon discharge  -prns for agitation seroquel 50mg po q6h prn /if refuses po Im/IV haldol 2mg q6h prn;   -patient does not meet criteria for inpatient psychiatric admission and reports motivation for outpatient follow up  -please provide the patient with the following referral information  •	[facility]  o	[email or web address]  o	Comprehensive addiction treatment services, including psychopharm, psychotherapy, drug testing, as well as eating disorder treatment, and specialized treatment for LGBTQ, Baptism Spiritism populations  o	Two locations  ?	[address] [facility] [address] [phone]  ?	[address], [place] [id number] [phone]  
26 y/o man with no prior PPH, and history of alcohol abuse, presenting to [facility] s/p OD after the patient became psychotic secondary to abusing crystal meth and alcohol. Patient initially presented s/p extubation with some confusion and persistent paranoia about being arrested for using drugs (r/o delirium vs substance induced psychosis). Collaterals from friends showed prior history of impulsivity and reckless behavior but no prior psychosis or suicidality.  Today he presents much improved, with stable mood, no PI/AVH. He denies any SI/HI and presents future oriented. He continues to endorse insomnia and racing thoughts.   PLan:    -switch seroquel to 100mg po qhs for psychosis and insomnia  -prns for agitation seroquel 50mg po q6h prn /if refuses po Im/IV haldol 2mg q6h prn;   -will continue to monitor the patient for persistence of symptoms while metabolizing the substance abused; based on the current presentation, more likely outpatient follow up or substance abuse treatment

## 2021-08-20 NOTE — DISCHARGE NOTE PROVIDER - HOSPITAL COURSE
#Discharge: do not delete  25M BIB EMS d/t AMS from intentional alcohol & drug OD of Unisom (doxylamine) Tylenol, & Benadryl of unknown quantity. Found to have mild transaminitis with elevated serum acetaminophen, alcohol & positive urine amphetamines. Admitted to MICU for airway protection, NAC infusion, monitoring of mental status & liver function.    Problem List/Main Diagnoses (system-based):     Inpatient treatment course:   New medications:   Labs to be followed outpatient:   Exam to be followed outpatient:    #Discharge: do not delete  25M BIB EMS d/t AMS from intentional alcohol & drug OD of Unisom (doxylamine) Tylenol, & Benadryl of unknown quantity. Found to have mild transaminitis with elevated serum acetaminophen, alcohol & positive urine amphetamines. Admitted to MICU for airway protection, NAC infusion, monitoring of mental status & liver function.    Problem List/Main Diagnoses (system-based):   #Hepatotoxicity: Hx of intentional acetaminophen, anticholinergic & alcohol OD. Pt was alert on EMS arrival but lost mental status en route to [facility], was A&Ox0 and was intubated, admitted to MICU for airway  monitoring and NAC treatment, now extubated s/p 9g NAC x2, charcoal 50g PO x2. INR1.38.  -Daily BMP to monitor electrolytes and replete as needed  -Trend LFTs, / > /  - Started folic acid 1mg daily  -GI recs:   - Continue NAC 9g daily while LFTs are elevated. D/w gi.   - Poison control notified, appreciate recommendations  - Anticipate LFTs to continue to rise as delayed response, within 72- 96hr  - Does not meet criteria for referral to liver transplant at this time  - Monitor LFTs, INR, pH, Cr, lactate, phos for re- assessment.     #Rhabdomyolysis: Increased CK now downtrending, 2534 ---> 1377 --> 1044 ---> 224.   No RBC in urine. S/p LR infusion 150mL/hr 12 hours.   -CK is downtrending, continue to monitor  -Can consider maintenance fluids if not resolving.     #Anemia: 15.8 on admission but trending down, pt now with hgb 11.9.   -Trend Hgb with daily CBC  Iron total - 77 normal; Unsaturated Iron Binding Capacity - 109 low; % sat, iron - 41 low; Transferrin - 144 low; Ferritin - 4092 high.     #Hypokalemia: K 3.9  -Daily BMP to trend potassium  -Replete as needed.     #Left ventricular hypertrophy: Admission EKG with voltage criteria for LVH. Currently no complaints of chest pain, tightness, or SOB.   optimize bp.     #Leukocytosis: On admission WBC of 7.70, but continuing to trend up during stay, now at 8.31 with 87.6% neutrophil predominance. No hx of fever during admission and pt with no complaints of fevers, chills, nausea, vomiting.   -Continue to trend WBC with daily CBC  -If persistent, consider further workup for leukocytosis.    #Psychiatric problem: Pt with intentional acetaminophen, anticholinergic & alcohol OD, with feelings of paranoia.   Feels good this AM  - Seroquel 100mg at bedtime  -Seroquel 50mg for agitation  -Psych following, appreciate recs.       Inpatient treatment course:   New medications:   Labs to be followed outpatient:   Exam to be followed outpatient:    #Discharge: do not delete  25M BIB EMS d/t AMS from intentional alcohol & drug OD of Unisom (doxylamine) Tylenol, & Benadryl of unknown quantity. Found to have mild transaminitis with elevated serum acetaminophen, alcohol & positive urine amphetamines. Admitted to MICU for airway protection, NAC infusion, monitoring of mental status & liver function, then stepped down to F for continued monitoring on NAC infusion.     Problem List/Main Diagnoses (system-based):   #Hepatotoxicity: Hx of intentional acetaminophen, anticholinergic & alcohol OD. Pt was alert on EMS arrival but lost mental status en route to [facility], was A&Ox0 and was intubated, admitted to MICU for airway  monitoring and NAC treatment. Pt's LFTs trended down on NAC infusion. Pt was also given folic acid daily.     #Rhabdomyolysis: Increased CK then downtrended on LR infusion.     #Hypokalemia: Repleted with KCl.     #Left ventricular hypertrophy: Admission EKG with voltage criteria for LVH. No complaints of chest pain, tightness, or SOB. Pt should follow up with ECHO outpatient.     #Psychiatric problem: Pt with intentional acetaminophen, anticholinergic & alcohol OD, with feelings of paranoia. Pt was given seroquel 100 mg qhs and seroquel 50mg q6h/PRN for agitation. Pt will follow up with psychiatry outpatient.     Inpatient treatment course:   New medications:   Labs to be followed outpatient:   Exam to be followed outpatient:    #Discharge: do not delete  25M BIB EMS d/t AMS from intentional alcohol & drug OD of Unisom (doxylamine) Tylenol, & Benadryl of unknown quantity. Found to have mild transaminitis with elevated serum acetaminophen, alcohol & positive urine amphetamines. Admitted to MICU for airway protection, NAC infusion, monitoring of mental status & liver function, then stepped down to F for continued monitoring on NAC infusion.     Problem List/Main Diagnoses (system-based):   #Hepatotoxicity: Hx of intentional acetaminophen, anticholinergic & alcohol OD. Pt was alert on EMS arrival but lost mental status en route to [facility], was A&Ox0 and was intubated, admitted to MICU for airway  monitoring and NAC treatment. Pt's LFTs trended down on NAC infusion. Pt was also given folic acid daily.     #Rhabdomyolysis: Increased CK then downtrended on LR infusion.     #Hypokalemia: Repleted with KCl.     #Left ventricular hypertrophy: Admission EKG with voltage criteria for LVH. No complaints of chest pain, tightness, or SOB. Pt should follow up with ECHO outpatient.     #Psychiatric problem: Pt with intentional acetaminophen, anticholinergic & alcohol OD, with feelings of paranoia. Pt was given seroquel 100 mg qhs and seroquel 50mg q6h/PRN for agitation. Pt will follow up with psychiatry outpatient.     Inpatient treatment course:   New medications:   Labs to be followed outpatient:   Exam to be followed outpatient:   Baseline physical exam upon discharge:    #Discharge: do not delete  25M BIB EMS d/t AMS from intentional alcohol & drug OD of Unisom (doxylamine) Tylenol, & Benadryl of unknown quantity. Found to have mild transaminitis with elevated serum acetaminophen, alcohol & positive urine amphetamines. Admitted to MICU for airway protection, NAC infusion, monitoring of mental status & liver function, then stepped down to [facility] for continued monitoring on NAC infusion.     Problem List/Main Diagnoses (system-based):   #Hepatotoxicity: Hx of intentional acetaminophen, anticholinergic & alcohol OD in the setting of methamphetamine intoxication. Pt was alert on EMS arrival but lost mental status en route to [facility], was A&Ox0 and was intubated, admitted to MICU for airway  monitoring and NAC treatment. Pt's LFTs trended down on NAC infusion. Condition improved and patient was extubated and stepped down. Psychiatry was consulted and patient was started on olanzapine 100 mg PO Qd at bedtime. After clinical improvement , patient was found to have decision making capacity and was not a threat to self or others and was discharged to follow up with psychiatry as an outpatient.     #Rhabdomyolysis: Increased CK then downtrended on LR infusion.     #Hypokalemia: Repleted with KCl.     #Left ventricular hypertrophy: Admission EKG with voltage criteria for LVH. No complaints of chest pain, tightness, or SOB. Pt should follow up with ECHO outpatient.     #Psychiatric problem: Pt with intentional acetaminophen, anticholinergic & alcohol OD, with feelings of paranoia. Pt was given seroquel 100 mg qhs and seroquel 50mg q6h/PRN for agitation. Pt will follow up with psychiatry outpatient.     New medications: Olanzipine 100 mg PO Qd at bedtime  Labs to be followed outpatient: None  Exam to be followed outpatient: None

## 2021-08-20 NOTE — DISCHARGE NOTE PROVIDER - PROVIDER TOKENS
PROVIDER:[TOKEN:[[id number]],FOLLOWUP:[1 week]] PROVIDER:[TOKEN:[[id number]],FOLLOWUP:[1 week]],PROVIDER:[TOKEN:[[id number]],SCHEDULEDAPPT:[08/30/2021],SCHEDULEDAPPTTIME:[12:00 PM]] PROVIDER:[TOKEN:[[id number]],SCHEDULEDAPPT:[08/30/2021],SCHEDULEDAPPTTIME:[12:00 PM]]

## 2021-08-20 NOTE — BH CONSULTATION LIAISON PROGRESS NOTE - CURRENT MEDICATION
MEDICATIONS  (STANDING):  acetylcysteine IVPB 9 Gram(s) IV Intermittent once  enoxaparin Injectable 40 milliGRAM(s) SubCutaneous every 24 hours  pantoprazole    Tablet 40 milliGRAM(s) Oral at bedtime  QUEtiapine 100 milliGRAM(s) Oral at bedtime    MEDICATIONS  (PRN):  benzocaine 15 mG/menthol 3.6 mG Lozenge 1 Lozenge Oral two times a day PRN Sore Throat  QUEtiapine 50 milliGRAM(s) Oral every 6 hours PRN agitation  
MEDICATIONS  (STANDING):  chlorhexidine 4% Liquid 1 Application(s) Topical <User Schedule>  dextrose 40% Gel 15 Gram(s) Oral once  dextrose 5%. 1000 milliLiter(s) (50 mL/Hr) IV Continuous <Continuous>  dextrose 5%. 1000 milliLiter(s) (100 mL/Hr) IV Continuous <Continuous>  dextrose 50% Injectable 25 Gram(s) IV Push once  dextrose 50% Injectable 12.5 Gram(s) IV Push once  dextrose 50% Injectable 25 Gram(s) IV Push once  enoxaparin Injectable 40 milliGRAM(s) SubCutaneous every 24 hours  glucagon  Injectable 1 milliGRAM(s) IntraMuscular once  insulin lispro (ADMELOG) corrective regimen sliding scale   SubCutaneous every 6 hours  pantoprazole  Injectable 40 milliGRAM(s) IV Push every 24 hours  QUEtiapine 50 milliGRAM(s) Oral every 12 hours    MEDICATIONS  (PRN):  benzocaine 15 mG/menthol 3.6 mG Lozenge 1 Lozenge Oral two times a day PRN Sore Throat  QUEtiapine 50 milliGRAM(s) Oral every 6 hours PRN agitation

## 2021-08-20 NOTE — BH CONSULTATION LIAISON PROGRESS NOTE - NSBHADMITCOUNSEL_PSY_A_CORE
diagnostic results/impressions and/or recommended studies/risks and benefits of treatment options
diagnostic results/impressions and/or recommended studies/risks and benefits of treatment options

## 2021-08-20 NOTE — PROGRESS NOTE ADULT - PROBLEM SELECTOR PLAN 4
Initially with K 2.7 trending upwards to 4.0, now 3.6, s/p 10mEq x5 IV & 40mEq via NGT, s/p 1g calcium gluconate IVP for cardiac protection.  -Daily BMP to trend potassium  -Replete as needed.

## 2021-08-20 NOTE — PROGRESS NOTE ADULT - PROBLEM SELECTOR PLAN 7
Pt with intentional acetaminophen, anticholinergic & alcohol OD, with feelings of paranoia.   Feels good this AM  - Seroquel 100mg at bedtime  -Seroquel 50mg for agitation  -Psych following, appreciate recs.

## 2021-08-20 NOTE — PROGRESS NOTE ADULT - ASSESSMENT
25M BIB EMS d/t AMS from intentional OD of Tylenol, Alcohol, Unisom (doxylamine), & Benadryl of unknown quantity. Found to have mild transaminitis with elevated serum acetaminophen, alcohol & positive urine amphetamines. Admitted to MICU for airway protection, NAC infusion, monitoring of mental status & liver function. Poison control following. GI consulted for Tylenol overdose.    #Acetaminophen overdose  - Likely presented within 24h of OD, although do not know exact timing  - Presented to [facility] ~ 1720 8/16  - Tylenol lvl >300 @ 1800 8/16 --> 8 @ 500 8/18  - Initial AST 99, ALT 48, Alk Phos 60, Bili 0.9, INR ~1, Lactate 10 -> yesterday AST/ ALT uptrending to 950/ 515  - S/p activated charcoal  - Completed 20h NAC protocol (started 2032 8/16)     --9g IV over first 60min     --3g IV over 4 hrs     --6g IV over 16hrs  - Continue NAC 9g to complete a 72 protocol  - Poison control notified, appreciate recommendations  - Anticipate LFTs to continue to rise as delayed response, within 72- 96hr  - [facility] criteria: 0   - Does not meet criteria for referral to liver transplant at this time  - Monitor LFTs, INR, pH, Cr, lactate, phos for re- assessment    Lupe Saleh MD  Gastroenterology Fellow, PGY -4   Pager [phone]  [phone]

## 2021-08-20 NOTE — BH CONSULTATION LIAISON PROGRESS NOTE - NSBHCHARTREVIEWVS_PSY_A_CORE FT
Vital Signs Last 24 Hrs  T(C): 37.1 (19 Aug 2021 12:00), Max: 37.7 (19 Aug 2021 05:00)  T(F): 98.8 (19 Aug 2021 12:00), Max: 99.9 (19 Aug 2021 05:00)  HR: 90 (19 Aug 2021 13:00) (63 - 108)  BP: 116/77 (19 Aug 2021 13:00) (101/59 - 124/85)  BP(mean): 92 (19 Aug 2021 13:00) (71 - 101)  RR: 28 (19 Aug 2021 13:00) (19 - 37)  SpO2: 97% (19 Aug 2021 13:00) (94% - 98%)
Vital Signs Last 24 Hrs  T(C): 36.6 (20 Aug 2021 06:05), Max: 37.6 (19 Aug 2021 16:22)  T(F): 97.9 (20 Aug 2021 06:05), Max: 99.7 (19 Aug 2021 16:22)  HR: 88 (20 Aug 2021 06:05) (76 - 98)  BP: 135/81 (20 Aug 2021 06:05) (114/67 - 135/81)  BP(mean): 102 (19 Aug 2021 16:22) (86 - 102)  RR: 18 (20 Aug 2021 06:05) (18 - 35)  SpO2: 98% (20 Aug 2021 06:05) (96% - 98%)

## 2021-08-20 NOTE — DISCHARGE NOTE PROVIDER - NSDCCPCAREPLAN_GEN_ALL_CORE_FT
PRINCIPAL DISCHARGE DIAGNOSIS  Diagnosis: Drug overdose  Assessment and Plan of Treatment:       SECONDARY DISCHARGE DIAGNOSES  Diagnosis: Hepatotoxicity  Assessment and Plan of Treatment:     Diagnosis: Anemia  Assessment and Plan of Treatment:     Diagnosis: Left ventricular hypertrophy  Assessment and Plan of Treatment:     Diagnosis: Psychiatric disorder  Assessment and Plan of Treatment:      PRINCIPAL DISCHARGE DIAGNOSIS  Diagnosis: Drug overdose  Assessment and Plan of Treatment: You were brought by ambulance due to an overdose on alcohol, tylenol, benadryl and doxylamine (a sleep aid medication). You were unconscious at your arrival. We put in a tube to help you breath, and eventually removed the tube once we confirmed that your lungs could work independently. You received a medication called n-acetylcysteine which helps aman      SECONDARY DISCHARGE DIAGNOSES  Diagnosis: Hepatotoxicity  Assessment and Plan of Treatment:     Diagnosis: Anemia  Assessment and Plan of Treatment:     Diagnosis: Left ventricular hypertrophy  Assessment and Plan of Treatment:     Diagnosis: Psychiatric disorder  Assessment and Plan of Treatment:      PRINCIPAL DISCHARGE DIAGNOSIS  Diagnosis: Drug overdose  Assessment and Plan of Treatment: You were brought by ambulance due to an overdose on alcohol, tylenol, benadryl and doxylamine (a sleep aid medication). You were unconscious at your arrival. We put in a tube to help you breath, and eventually removed the tube once we confirmed that your lungs could work independently. You received a medication called n-acetylcysteine which helps protect the liver from the damaging effects of toxic substances. The psychiatry service saw you while in the hospital and started you on a medication called onlanzipine which may help you prevent further behavioral problems leading to drug overdose. You were discharged with a 7 day supply of this medication which was provided at no cost to you. The psychiatry team believes you may not require this medication for an extended course.   Following your discharge, please follow up with outpatient psychiatry. Please call the number provided with this document and see a provider at the [facility] located at [address] [place] [address], [place] [address] for further evaluation and treatment. No appointmen is necessary and you are able to walk into the clinic for assistance. These doctors can facilitate your ongoing psychiatric care. Please abstain from drug use as this may worsen your liver function and may be highly deliterious to your health.   In addition, please follow up with the provided appointment at the [facility] clinic. At this appointment please have blood drawn to assess a CBC, CMP, and coagulation markers, as this is an indication of your over all liver function.  If following your discharge, you begin experiencing suicidal thoughts, or have a suicidal action, please seek help and call 911 for emergency service. If you believe you may be a danger to yourself, please call 911 or go to the closest emergency room for emergency psychiatric care. Please take all medications as prescribed and follow up with all appointments.      SECONDARY DISCHARGE DIAGNOSES  Diagnosis: Hepatotoxicity  Assessment and Plan of Treatment:     Diagnosis: Anemia  Assessment and Plan of Treatment:     Diagnosis: Left ventricular hypertrophy  Assessment and Plan of Treatment:     Diagnosis: Psychiatric disorder  Assessment and Plan of Treatment:

## 2021-08-20 NOTE — BH CONSULTATION LIAISON PROGRESS NOTE - NSBHCONSULTFOLLOWAFTERCARE_PSY_A_CORE FT
•	[facility]  o	[email or web address]  o	Comprehensive addiction treatment services, including psychopharm, psychotherapy, drug testing, as well as eating disorder treatment, and specialized treatment for LGBTQ, Confucianist [employer] populations  o	Two locations  ?	[address] [place] [address], 7th [facility] [address] [phone]  ?	[address] [place] [address] [phone]

## 2021-08-20 NOTE — PROGRESS NOTE ADULT - PROBLEM SELECTOR PLAN 6
On admission WBC of 7.70, but continuing to trend up during stay, now at 8.31 with 87.6% neutrophil predominance. No hx of fever during admission and pt with no complaints of fevers, chills, nausea, vomiting.   -Continue to trend WBC with daily CBC  -If persistent, consider further workup for leukocytosis.

## 2021-08-20 NOTE — DISCHARGE NOTE PROVIDER - CARE PROVIDER_API CALL
Bernarda Tavares)  Psychiatry; Psychosomatic Medicine  [facility] [address] [place] [address], [place] [id number]  Phone: [phone]  Fax: [phone]  Follow Up Time: 1 week   Bernarda Tavares)  Psychiatry; Psychosomatic Medicine  [address] [address]  Phone: [phone]  Fax: [phone]  Follow Up Time: 1 week    Corey Howard)  Internal Medicine  [address], [address]nd [place] [address]  Phone: [phone]  Fax: [phone]  Scheduled Appointment: 08/30/2021 12:00 PM   Corey Howard)  Internal Medicine  [address], [address]nd [place] [address]  Phone: [phone]  Fax: [phone]  Scheduled Appointment: 08/30/2021 12:00 PM

## 2021-08-20 NOTE — PROGRESS NOTE ADULT - PROBLEM SELECTOR PLAN 8
F: None   E: Replete as necessary K>4 Mg>2  N: Normal diet  DVT Prophylaxis: Lovenox 40mg SQ q24h  GI prophylaxis: Protonix 40mg IVP q24h   CODE STATUS: FULL  Dispo: Home

## 2021-08-20 NOTE — PROGRESS NOTE ADULT - PROBLEM SELECTOR PLAN 5
Admission EKG with voltage criteria for LVH. Currently no complaints of chest pain, tightness, or SOB.   -Consider Echocardiogram in AM to evaluate for left ventricular hypertrophy.

## 2021-08-20 NOTE — DISCHARGE NOTE PROVIDER - NSDCFUADDAPPT_GEN_ALL_CORE_FT
[facility]  [email or web address]  Comprehensive addiction treatment services, including psychopharm, psychotherapy, drug testing, as well as eating disorder treatment, and specialized treatment for LGBTQ, Jainism Mu-ism populations  Two locations  - [address], 7th [facility] [address] [phone]  - [address] [place] [id number] [phone]   Please bring your Insurance card, Photo ID, Covid-19 vaccination card (if applicable) and Discharge paperwork to the following appointment:  (1) Please follow up with your Primary Care Provider, Dr. Myra Skelton on behalf of Dr. Corey Howard at [address] [place] [address], [place] [address] on 08/30/2021 at 12:00pm.    Appointment was scheduled by Ms. LIS Gonzales, Referral Coordinator.    (2) [facility]  [email or web address]  Comprehensive addiction treatment services, including psychopharm, psychotherapy, drug testing, as well as eating disorder treatment, and specialized treatment for LGBTQ, Christianity Tenriism populations  Two locations  - [address] [place] [address], 7th Floor [facility] [address]78[address] [phone]  - [address] [place] [address] [phone]   Please bring your Insurance card, Photo ID, Covid-19 vaccination card (if applicable) and Discharge paperwork to the following appointment:    (1) Please follow up with your Primary Care Provider, Dr. Myra Skelton on behalf of Dr. Corey Howard at [address] [place] [address], [place]nd [place] [address] on 08/30/2021 at 12:00pm.    Appointment was scheduled by Ms. LIS Gonzales, Referral Coordinator.    (2) [facility]  [email or web address]  Comprehensive addiction treatment services, including psychopharm, psychotherapy, drug testing, as well as eating disorder treatment, and specialized treatment for LGBTQ, Mor[employer] Restorationism populations  Two locations  - [address] [place] [address], 7th Floor [facility] [id number] [phone]  - [address] [place] [id number] [phone]

## 2021-08-20 NOTE — BH CONSULTATION LIAISON PROGRESS NOTE - NSBHFUPINTERVALHXFT_PSY_A_CORE
Patient seen at bedside. He presents calm and cooperative. states that he has been sleeping better (woke up because of noise several times) and hasn't had any racing thoughts. Denies any psychotic symptoms and has insight into the relationship between using crystal meth and having paranoia. Denies any SI/HI and presents future oriented. He reports motivation to start outpatient mental health follow up.

## 2021-08-20 NOTE — PROGRESS NOTE ADULT - SUBJECTIVE AND OBJECTIVE BOX
25M BIB d/t AMS from intentional alcohol & drug OD of doxylamine, Tylenol, & Benadryl  admitted to MICU for airway protection, NAC infusion,  now s/p extubation, at 7-uris for further monitoring of his LFTs, CK; psych following    HOSPITAL COURSE:  25M BIB EMS on 8/16 d/t AMS from intentional alcohol & drug OD of doxylamine, Tylenol, & Benadryl while on methamphetamines. Found to have mild transaminitis with elevated serum acetaminophen. urine positive for amphetamines. Intubated and given calcium gluconate. Admitted to MICU for airway protection, NAC infusion, monitoring of mental status & liver function. GI and poison control consulted. Extubated 8/18. Received 2nd NAC infusion on 8/18 due to increasing LFT's. Pt endorses 5 alcoholic drinks 5 times a week. Not showing any signs of withdrawal. LFT's, CK, all now downtrending. Psych following for anxiety, paranoia and substance use. Started on Seroquel for paranoia.    OVERNIGHT EVENTS: No AOE    SUBJECTIVE / INTERVAL HPI: Patient seen and examined at bedside. Pt feels well with no complaints. Pt mentions feeling pain in his throat from the extubation. He feels a bit nauseous, mentions it happens when he doesn't eat properly. He denies fever/chills, myalgia, vomiting, lightheadedness, CP, SOB, palpitations, abdominal pain or anxiety.     MEDICATIONS  (STANDING):  dextrose 40% Gel 15 Gram(s) Oral once  dextrose 5%. 1000 milliLiter(s) (50 mL/Hr) IV Continuous <Continuous>  dextrose 5%. 1000 milliLiter(s) (100 mL/Hr) IV Continuous <Continuous>  dextrose 50% Injectable 25 Gram(s) IV Push once  dextrose 50% Injectable 12.5 Gram(s) IV Push once  dextrose 50% Injectable 25 Gram(s) IV Push once  enoxaparin Injectable 40 milliGRAM(s) SubCutaneous every 24 hours  glucagon  Injectable 1 milliGRAM(s) IntraMuscular once  pantoprazole    Tablet 40 milliGRAM(s) Oral at bedtime  QUEtiapine 100 milliGRAM(s) Oral at bedtime    MEDICATIONS  (PRN):  benzocaine 15 mG/menthol 3.6 mG Lozenge 1 Lozenge Oral two times a day PRN Sore Throat  QUEtiapine 50 milliGRAM(s) Oral every 6 hours PRN agitation    Allergies    Allergy Status Unknown    Intolerances        VITAL SIGNS:  Vital Signs Last 24 Hrs  T(C): 36.6 (20 Aug 2021 06:05), Max: 37.6 (19 Aug 2021 16:22)  T(F): 97.9 (20 Aug 2021 06:05), Max: 99.7 (19 Aug 2021 16:22)  HR: 88 (20 Aug 2021 06:05) (76 - 98)  BP: 135/81 (20 Aug 2021 06:05) (109/69 - 135/81)  BP(mean): 102 (19 Aug 2021 16:22) (84 - 102)  RR: 18 (20 Aug 2021 06:05) (18 - 35)  SpO2: 98% (20 Aug 2021 06:05) (94% - 98%)      08-19-21 @ 07:01  -  08-20-21 @ 07:00  --------------------------------------------------------  IN: 696 mL / OUT: 2150 mL / NET: -1454 mL        PHYSICAL EXAM:  General: Pt well appearing in no acute distress, not feeling anxious.  HEENT: NC/AT; PERRL, anicteric sclera; MMM  Neck: supple  Cardiovascular: +S1/S2, RRR  Respiratory: CTA B/L; no W/R/R  Gastrointestinal: soft, NT/ND; +BSx4  Extremities: WWP; no edema, clubbing or cyanosis  Vascular: 2+ radial, DP/PT pulses B/L  Neurological: AAOx3; no focal deficits    LABS:                        12.4   8.31  )-----------( 188      ( 20 Aug 2021 05:52 )             36.1     08-20    136  |  103  |  3<L>  ----------------------------<  128<H>  3.6   |  25  |  0.62    Ca    8.9      20 Aug 2021 05:52  Phos  2.1     08-19  Mg     1.7     08-20    TPro  6.4  /  Alb  3.4  /  TBili  0.8  /  DBili  x   /  AST  950<H>  /  ALT  515<H>  /  AlkPhos  75  08-20    PT/INR - ( 19 Aug 2021 05:36 )   PT: 16.3 sec;   INR: 1.38          PTT - ( 19 Aug 2021 05:36 )  PTT:36.6 sec    CAPILLARY BLOOD GLUCOSE      POCT Blood Glucose.: 107 mg/dL (19 Aug 2021 17:35)  POCT Blood Glucose.: 124 mg/dL (19 Aug 2021 11:37)          RADIOLOGY & ADDITIONAL TESTS: Reviewed. OVERNIGHT EVENTS: No AOE    SUBJECTIVE / INTERVAL HPI: Patient seen and examined at bedside. Pt feels well with no complaints. Pt mentions feeling pain in his throat from the extubation. He feels a bit nauseous, mentions it happens when he doesn't eat properly. He denies fever/chills, myalgia, vomiting, lightheadedness, CP, SOB, palpitations, abdominal pain or anxiety.     MEDICATIONS  (STANDING):  dextrose 40% Gel 15 Gram(s) Oral once  dextrose 5%. 1000 milliLiter(s) (50 mL/Hr) IV Continuous <Continuous>  dextrose 5%. 1000 milliLiter(s) (100 mL/Hr) IV Continuous <Continuous>  dextrose 50% Injectable 25 Gram(s) IV Push once  dextrose 50% Injectable 12.5 Gram(s) IV Push once  dextrose 50% Injectable 25 Gram(s) IV Push once  enoxaparin Injectable 40 milliGRAM(s) SubCutaneous every 24 hours  glucagon  Injectable 1 milliGRAM(s) IntraMuscular once  pantoprazole    Tablet 40 milliGRAM(s) Oral at bedtime  QUEtiapine 100 milliGRAM(s) Oral at bedtime    MEDICATIONS  (PRN):  benzocaine 15 mG/menthol 3.6 mG Lozenge 1 Lozenge Oral two times a day PRN Sore Throat  QUEtiapine 50 milliGRAM(s) Oral every 6 hours PRN agitation    Allergies    Allergy Status Unknown    Intolerances        VITAL SIGNS:  Vital Signs Last 24 Hrs  T(C): 36.6 (20 Aug 2021 06:05), Max: 37.6 (19 Aug 2021 16:22)  T(F): 97.9 (20 Aug 2021 06:05), Max: 99.7 (19 Aug 2021 16:22)  HR: 88 (20 Aug 2021 06:05) (76 - 98)  BP: 135/81 (20 Aug 2021 06:05) (109/69 - 135/81)  BP(mean): 102 (19 Aug 2021 16:22) (84 - 102)  RR: 18 (20 Aug 2021 06:05) (18 - 35)  SpO2: 98% (20 Aug 2021 06:05) (94% - 98%)      08-19-21 @ 07:01  -  08-20-21 @ 07:00  --------------------------------------------------------  IN: 696 mL / OUT: 2150 mL / NET: -1454 mL        PHYSICAL EXAM:  General: Pt well appearing in no acute distress, not feeling anxious.  HEENT: NC/AT; PERRL, anicteric sclera; MMM  Neck: supple  Cardiovascular: +S1/S2, RRR  Respiratory: CTA B/L; no W/R/R  Gastrointestinal: soft, NT/ND; +BSx4  Extremities: WWP; no edema, clubbing or cyanosis  Vascular: 2+ radial, DP/PT pulses B/L  Neurological: AAOx3; no focal deficits    LABS:                        12.4   8.31  )-----------( 188      ( 20 Aug 2021 05:52 )             36.1     08-20    136  |  103  |  3<L>  ----------------------------<  128<H>  3.6   |  25  |  0.62    Ca    8.9      20 Aug 2021 05:52  Phos  2.1     08-19  Mg     1.7     08-20    TPro  6.4  /  Alb  3.4  /  TBili  0.8  /  DBili  x   /  AST  950<H>  /  ALT  515<H>  /  AlkPhos  75  08-20    PT/INR - ( 19 Aug 2021 05:36 )   PT: 16.3 sec;   INR: 1.38          PTT - ( 19 Aug 2021 05:36 )  PTT:36.6 sec    CAPILLARY BLOOD GLUCOSE      POCT Blood Glucose.: 107 mg/dL (19 Aug 2021 17:35)  POCT Blood Glucose.: 124 mg/dL (19 Aug 2021 11:37)          RADIOLOGY & ADDITIONAL TESTS: Reviewed.

## 2021-08-20 NOTE — PROGRESS NOTE ADULT - PROBLEM SELECTOR PLAN 2
Increased CK now downtrending, 2534 ---> 1377 --> 1044 ---> 224.   No RBC in urine. S/p LR infusion 150mL/hr 12 hours.   -CK is downtrending, continue to monitor  -Can consider maintenance fluids if not resolving.

## 2021-08-20 NOTE — PROGRESS NOTE ADULT - SUBJECTIVE AND OBJECTIVE BOX
GASTROENTEROLOGY PROGRESS NOTE  Patient seen and examined at bedside.  NEMESIO.   Transferred out of ICU  Dizziness resolved.  LFTs uptrending, still on NAC    PERTINENT REVIEW OF SYSTEMS:  CONSTITUTIONAL: No weakness, fevers or chills  HEENT: No visual changes; No vertigo or throat pain   GASTROINTESTINAL: As above.  NEUROLOGICAL: No numbness or weakness  SKIN: No itching, burning, rashes, or lesions     Allergies    Allergy Status Unknown    Intolerances      MEDICATIONS:  MEDICATIONS  (STANDING):  dextrose 40% Gel 15 Gram(s) Oral once  dextrose 5%. 1000 milliLiter(s) (50 mL/Hr) IV Continuous <Continuous>  dextrose 5%. 1000 milliLiter(s) (100 mL/Hr) IV Continuous <Continuous>  dextrose 50% Injectable 25 Gram(s) IV Push once  dextrose 50% Injectable 12.5 Gram(s) IV Push once  dextrose 50% Injectable 25 Gram(s) IV Push once  enoxaparin Injectable 40 milliGRAM(s) SubCutaneous every 24 hours  glucagon  Injectable 1 milliGRAM(s) IntraMuscular once  pantoprazole    Tablet 40 milliGRAM(s) Oral at bedtime  QUEtiapine 100 milliGRAM(s) Oral at bedtime    MEDICATIONS  (PRN):  benzocaine 15 mG/menthol 3.6 mG Lozenge 1 Lozenge Oral two times a day PRN Sore Throat  QUEtiapine 50 milliGRAM(s) Oral every 6 hours PRN agitation    Vital Signs Last 24 Hrs  T(C): 36.6 (20 Aug 2021 06:05), Max: 37.6 (19 Aug 2021 16:22)  T(F): 97.9 (20 Aug 2021 06:05), Max: 99.7 (19 Aug 2021 16:22)  HR: 88 (20 Aug 2021 06:05) (76 - 98)  BP: 135/81 (20 Aug 2021 06:05) (109/69 - 135/81)  BP(mean): 102 (19 Aug 2021 16:22) (84 - 102)  RR: 18 (20 Aug 2021 06:05) (18 - 35)  SpO2: 98% (20 Aug 2021 06:05) (94% - 98%)    08-19 @ 07:01  -  08-20 @ 07:00  --------------------------------------------------------  IN: 696 mL / OUT: 2150 mL / NET: -1454 mL      PHYSICAL EXAM:    General: in no acute distress  HEENT: MMM, conjunctiva and sclera clear  Gastrointestinal: Soft non-tender non-distended; No rebound or guarding  Skin: Warm and dry. No obvious rash    LABS:                        12.4   8.31  )-----------( 188      ( 20 Aug 2021 05:52 )             36.1     08-20    136  |  103  |  3<L>  ----------------------------<  128<H>  3.6   |  25  |  0.62    Ca    8.9      20 Aug 2021 05:52  Phos  2.1     08-19  Mg     1.7     08-20    TPro  6.4  /  Alb  3.4  /  TBili  0.8  /  DBili  x   /  AST  950<H>  /  ALT  515<H>  /  AlkPhos  75  08-20    PT/INR - ( 19 Aug 2021 05:36 )   PT: 16.3 sec;   INR: 1.38          PTT - ( 19 Aug 2021 05:36 )  PTT:36.6 sec                  RADIOLOGY & ADDITIONAL STUDIES:  Reviewed

## 2021-08-20 NOTE — PROGRESS NOTE ADULT - ATTENDING COMMENTS
#Acetaminophen overdose  - Likely presented within 24h of OD, although do not know exact timing  - Presented to [facility] ~ 1720 8/16  - Tylenol lvl >300 @ 1800 8/16 --> 8 @ 500 8/18  - Initial AST 99, ALT 48, Alk Phos 60, Bili 0.9, INR ~1, Lactate 10 -> yesterday AST/ ALT uptrending to 950/ 515  - S/p activated charcoal  - Completed 20h NAC protocol (started 2032 8/16)     --9g IV over first 60min     --3g IV over 4 hrs     --6g IV over 16hrs  - Continue NAC 9g to complete a 72 protocol  - Poison control notified, appreciate recommendations  - Anticipate LFTs to continue to rise as delayed response, within 72- 96hr  - [employer] criteria: 0   - Does not meet criteria for referral to liver transplant at this time  - Monitor LFTs, INR, pH, Cr, lactate, phos for re- assessment

## 2021-08-20 NOTE — DISCHARGE NOTE PROVIDER - NSDCMRMEDTOKEN_GEN_ALL_CORE_FT
SEROquel 50 mg oral tablet: 1 tab(s) orally every 6 hours, As needed, agitation   SEROquel 100 mg oral tablet: 1 tab(s) orally once a day (at bedtime)

## 2021-08-20 NOTE — BH CONSULTATION LIAISON PROGRESS NOTE - LEVEL OF CONSCIOUSNESS
Writer spoke with patient. Patient is to have CT scan in [place]. Writer gathered information of facility she would be going to:  [facility] in [place].  Writer stated the order would be faxed over.  Patient noted she would call them to set up a time and date once the order is in.    STEFANIE Sanders  
Alert
Alert

## 2021-08-20 NOTE — BH CONSULTATION LIAISON PROGRESS NOTE - NSBHCONSFOLLOWNEEDS_PSY_ALL_CORE
No psychiatric contraindications to discharge
Needs further psych safety assessment prior to discharge
Yes

## 2021-08-20 NOTE — DISCHARGE NOTE PROVIDER - CARE PROVIDERS DIRECT ADDRESSES
,DirectAddress_Unknown ,DirectAddress_Unknown,[email or web address]

## 2021-08-20 NOTE — PROGRESS NOTE ADULT - PROBLEM SELECTOR PLAN 3
15.8 on admission but trending down, pt now with hgb 11.9.   -Trend Hgb with daily CBC  -Iron studies AM  -Reticulocyte count AM.    Iron total - 77 normal; Unsaturated Iron Binding Capacity - 109 low; % sat, iron - 41 low; Transferrin - 144 low; Ferritin - 4092 high

## 2021-08-20 NOTE — PROGRESS NOTE ADULT - ASSESSMENT
25M BIB EMS d/t AMS from intentional alcohol & drug OD of doxylamine, Tylenol, & Benadryl of unknown quantity, intubated and admitted to MICU for airway protection, NAC infusion, and further monitoring, now s/p extubation, with LFT's, CK, all downtrending, now at 7-uris for further monitoring

## 2021-08-20 NOTE — PROGRESS NOTE ADULT - PROBLEM SELECTOR PLAN 1
Hx of intentional acetaminophen, anticholinergic & alcohol OD. Pt was alert on EMS arrival but lost mental status en route to [facility], was A&Ox0 and was intubated, admitted to MICU for airway  monitoring and NAC treatment, now extubated s/p 9g NAC x2, charcoal 50g PO x2. INR1.38.  -Daily BMP to monitor electrolytes and replete as needed  -Trend LFTs, / > /  -Poison control and GI following. Hx of intentional acetaminophen, anticholinergic & alcohol OD. Pt was alert on EMS arrival but lost mental status en route to [facility], was A&Ox0 and was intubated, admitted to MICU for airway  monitoring and NAC treatment, now extubated s/p 9g NAC x2, charcoal 50g PO x2. INR1.38.    -Daily BMP to monitor electrolytes and replete as needed  -Trend LFTs, / > /  -GI recs:   - Continue NAC 9g to complete a 72 protocol  - Poison control notified, appreciate recommendations  - Anticipate LFTs to continue to rise as delayed response, within 72- 96hr  - Does not meet criteria for referral to liver transplant at this time  - Monitor LFTs, INR, pH, Cr, lactate, phos for re- assessment

## 2021-08-20 NOTE — BH CONSULTATION LIAISON PROGRESS NOTE - NSBHFUPINTERVALCCFT_PSY_A_CORE
Patient seen at bedside. He presents calm, cooperative, well related. States that his mood has improved since yesterday in context of not feeling scared that he will get arrested for his drug use. He denies any AVH/Pi. He still endorses insomnia and racing thoughts (however much improved since yesterday)/ He denies any SI/HI and feels safe in the hospital. He was visited today but his friend. 
"I feel better"

## 2021-08-21 LAB
ALBUMIN SERPL ELPH-MCNC: 3.7 G/DL — SIGNIFICANT CHANGE UP (ref 3.3–5)
ALP SERPL-CCNC: 95 U/L — SIGNIFICANT CHANGE UP (ref 40–120)
ALT FLD-CCNC: 802 U/L — HIGH (ref 10–45)
ANION GAP SERPL CALC-SCNC: 9 MMOL/L — SIGNIFICANT CHANGE UP (ref 5–17)
APTT BLD: 39 SEC — HIGH (ref 27.5–35.5)
AST SERPL-CCNC: 1014 U/L — HIGH (ref 10–40)
BILIRUB SERPL-MCNC: 0.6 MG/DL — SIGNIFICANT CHANGE UP (ref 0.2–1.2)
BUN SERPL-MCNC: 5 MG/DL — LOW (ref 7–23)
CALCIUM SERPL-MCNC: 9.3 MG/DL — SIGNIFICANT CHANGE UP (ref 8.4–10.5)
CHLORIDE SERPL-SCNC: 102 MMOL/L — SIGNIFICANT CHANGE UP (ref 96–108)
CO2 SERPL-SCNC: 25 MMOL/L — SIGNIFICANT CHANGE UP (ref 22–31)
CREAT SERPL-MCNC: 0.63 MG/DL — SIGNIFICANT CHANGE UP (ref 0.5–1.3)
GLUCOSE SERPL-MCNC: 103 MG/DL — HIGH (ref 70–99)
HCT VFR BLD CALC: 36.5 % — LOW (ref 39–50)
HGB BLD-MCNC: 12.4 G/DL — LOW (ref 13–17)
INR BLD: 1.05 — SIGNIFICANT CHANGE UP (ref 0.88–1.16)
MAGNESIUM SERPL-MCNC: 2 MG/DL — SIGNIFICANT CHANGE UP (ref 1.6–2.6)
MCHC RBC-ENTMCNC: 32.8 PG — SIGNIFICANT CHANGE UP (ref 27–34)
MCHC RBC-ENTMCNC: 34 GM/DL — SIGNIFICANT CHANGE UP (ref 32–36)
MCV RBC AUTO: 96.6 FL — SIGNIFICANT CHANGE UP (ref 80–100)
NRBC # BLD: 0 /100 WBCS — SIGNIFICANT CHANGE UP (ref 0–0)
PHOSPHATE SERPL-MCNC: 3.6 MG/DL — SIGNIFICANT CHANGE UP (ref 2.5–4.5)
PLATELET # BLD AUTO: 235 K/UL — SIGNIFICANT CHANGE UP (ref 150–400)
POTASSIUM SERPL-MCNC: 3.9 MMOL/L — SIGNIFICANT CHANGE UP (ref 3.5–5.3)
POTASSIUM SERPL-SCNC: 3.9 MMOL/L — SIGNIFICANT CHANGE UP (ref 3.5–5.3)
PROT SERPL-MCNC: 6.8 G/DL — SIGNIFICANT CHANGE UP (ref 6–8.3)
PROTHROM AB SERPL-ACNC: 12.6 SEC — SIGNIFICANT CHANGE UP (ref 10.6–13.6)
RBC # BLD: 3.78 M/UL — LOW (ref 4.2–5.8)
RBC # FLD: 12.1 % — SIGNIFICANT CHANGE UP (ref 10.3–14.5)
SODIUM SERPL-SCNC: 136 MMOL/L — SIGNIFICANT CHANGE UP (ref 135–145)
WBC # BLD: 4.38 K/UL — SIGNIFICANT CHANGE UP (ref 3.8–10.5)
WBC # FLD AUTO: 4.38 K/UL — SIGNIFICANT CHANGE UP (ref 3.8–10.5)

## 2021-08-21 PROCEDURE — 99233 SBSQ HOSP IP/OBS HIGH 50: CPT | Mod: GC

## 2021-08-21 PROCEDURE — 99232 SBSQ HOSP IP/OBS MODERATE 35: CPT

## 2021-08-21 PROCEDURE — 93010 ELECTROCARDIOGRAM REPORT: CPT

## 2021-08-21 RX ORDER — FOLIC ACID 0.8 MG
1 TABLET ORAL DAILY
Refills: 0 | Status: DISCONTINUED | OUTPATIENT
Start: 2021-08-21 | End: 2021-08-21

## 2021-08-21 RX ORDER — ACETYLCYSTEINE 200 MG/ML
9 VIAL (ML) MISCELLANEOUS ONCE
Refills: 0 | Status: COMPLETED | OUTPATIENT
Start: 2021-08-21 | End: 2021-08-21

## 2021-08-21 RX ORDER — FOLIC ACID 0.8 MG
1 TABLET ORAL DAILY
Refills: 0 | Status: DISCONTINUED | OUTPATIENT
Start: 2021-08-21 | End: 2021-08-23

## 2021-08-21 RX ADMIN — Medication 1 MILLIGRAM(S): at 12:14

## 2021-08-21 RX ADMIN — Medication 100 MILLIGRAM(S): at 12:14

## 2021-08-21 RX ADMIN — Medication 43.54 GRAM(S): at 19:16

## 2021-08-21 RX ADMIN — PANTOPRAZOLE SODIUM 40 MILLIGRAM(S): 20 TABLET, DELAYED RELEASE ORAL at 21:29

## 2021-08-21 RX ADMIN — QUETIAPINE FUMARATE 100 MILLIGRAM(S): 200 TABLET, FILM COATED ORAL at 21:29

## 2021-08-21 RX ADMIN — Medication 1 TABLET(S): at 12:14

## 2021-08-21 NOTE — PROGRESS NOTE ADULT - SUBJECTIVE AND OBJECTIVE BOX
GASTROENTEROLOGY PROGRESS NOTE  Patient seen and examined at bedside. Feels well, no acute issues.     PERTINENT REVIEW OF SYSTEMS:  CONSTITUTIONAL: No weakness, fevers or chills  HEENT: No visual changes; No vertigo or throat pain   GASTROINTESTINAL: As above.  NEUROLOGICAL: No numbness or weakness  SKIN: No itching, burning, rashes, or lesions     Allergies    Allergy Status Unknown    Intolerances      MEDICATIONS:  MEDICATIONS  (STANDING):  enoxaparin Injectable 40 milliGRAM(s) SubCutaneous every 24 hours  folic acid 1 milliGRAM(s) Oral daily  multivitamin 1 Tablet(s) Oral daily  pantoprazole    Tablet 40 milliGRAM(s) Oral at bedtime  QUEtiapine 100 milliGRAM(s) Oral at bedtime  thiamine 100 milliGRAM(s) Oral daily    MEDICATIONS  (PRN):  benzocaine 15 mG/menthol 3.6 mG Lozenge 1 Lozenge Oral two times a day PRN Sore Throat  QUEtiapine 50 milliGRAM(s) Oral every 6 hours PRN agitation    Vital Signs Last 24 Hrs  T(C): 36.8 (21 Aug 2021 14:03), Max: 36.8 (21 Aug 2021 14:03)  T(F): 98.2 (21 Aug 2021 14:03), Max: 98.2 (21 Aug 2021 14:03)  HR: 84 (21 Aug 2021 14:03) (65 - 85)  BP: 114/72 (21 Aug 2021 14:03) (110/66 - 123/82)  BP(mean): --  RR: 17 (21 Aug 2021 14:03) (17 - 18)  SpO2: 98% (21 Aug 2021 14:03) (96% - 99%)    08-20 @ 07:01  -  08-21 @ 07:00  --------------------------------------------------------  IN: 522 mL / OUT: 1650 mL / NET: -1128 mL    08-21 @ 07:01  -  08-21 @ 20:46  --------------------------------------------------------  IN: 0 mL / OUT: 1300 mL / NET: -1300 mL      PHYSICAL EXAM:    General: in no acute distress  HEENT: MMM, conjunctiva and sclera clear  Gastrointestinal: Soft non-tender non-distended; No rebound or guarding  Skin: Warm and dry. No obvious rash    LABS:                        12.4   4.38  )-----------( 235      ( 21 Aug 2021 05:50 )             36.5     08-21    136  |  102  |  5<L>  ----------------------------<  103<H>  3.9   |  25  |  0.63    Ca    9.3      21 Aug 2021 05:50  Phos  3.6     08-21  Mg     2.0     08-21    TPro  6.8  /  Alb  3.7  /  TBili  0.6  /  DBili  x   /  AST  1014<H>  /  ALT  802<H>  /  AlkPhos  95  08-21    PT/INR - ( 21 Aug 2021 05:50 )   PT: 12.6 sec;   INR: 1.05          PTT - ( 21 Aug 2021 05:50 )  PTT:39.0 sec                  RADIOLOGY & ADDITIONAL STUDIES:  Reviewed

## 2021-08-21 NOTE — PROGRESS NOTE ADULT - PROBLEM SELECTOR PLAN 3
15.8 on admission but trending down, pt now with hgb 11.9.   -Trend Hgb with daily CBC  -Reticulocyte count AM.    Iron total - 77 normal; Unsaturated Iron Binding Capacity - 109 low; % sat, iron - 41 low; Transferrin - 144 low; Ferritin - 4092 high 15.8 on admission but trending down, pt now with hgb 11.9.   -Trend Hgb with daily CBC     Iron total - 77 normal; Unsaturated Iron Binding Capacity - 109 low; % sat, iron - 41 low; Transferrin - 144 low; Ferritin - 4092 high

## 2021-08-21 NOTE — PROGRESS NOTE ADULT - ATTENDING COMMENTS
#Acetaminophen overdose  - Likely presented within 24h of OD, although do not know exact timing  - Presented to [facility] ~ 1720 8/16  - Tylenol lvl >300 @ 1800 8/16 --> 8 @ 500 8/18  - Initial AST 99, ALT 48, Alk Phos 60, Bili 0.9, INR ~1, Lactate 10 now uptrending  - S/p activated charcoal  - Completed 20h NAC protocol (started 2032 8/16)     --9g IV over first 60min     --3g IV over 4 hrs     --6g IV over 16hrs  - Continue NAC 9g while transaminases are elevated   - Poison control notified, appreciate recommendations  - Anticipate LFTs to continue to rise as delayed response, within 72- 96hr  - [employer] criteria: 0   - Does not meet criteria for referral to liver transplant at this time  - Monitor LFTs, INR, pH, Cr, lactate, phos for re- assessment  - synthetic function is intact at this time

## 2021-08-21 NOTE — PROGRESS NOTE ADULT - ATTENDING COMMENTS
I agree with the above findings, assessment, and plan with the following additions and exceptions:     In brief, this is a 26 y/o M with a PMHx of alcohol abuse (no known history of withdrawal) admitted to ICU for intubation s/p medication overdose in setting of polysubstance use including crystal meth and presumably Tylenol containing narcotics.  #Acute liver injury 2/2 tylenol OD -- no abd pain; patient lucid; pt has been receiving doses of NAC throughout hospitalization; we will clarify with GI if continued dosing needed at this time; our team is in contact with poison control; trend LFTs.   #Drug induced mood psychosis -- no need for one to one per psych, on seroquel per psych.  #Alcohol abuse -- no s/s of w/d; c/w vitamin supplementation; encourage po intake.   #RPR positive -- will need to clarify if patient was treated prior for syphilis; FTA can be sent.   Rest of plan as above    Dr. Frank Lugo, DO  Attending Physician  Division of Hospital Medicine  Available via Microsoft Teams (preferred)  Also available via Pager [phone] I agree with the above findings, assessment, and plan with the following additions and exceptions:     In brief, this is a 26 y/o M with a PMHx of alcohol abuse (no known history of withdrawal) admitted to ICU for intubation s/p medication overdose in setting of polysubstance use including crystal meth and presumably Tylenol containing narcotics.  #Acute liver injury 2/2 tylenol OD -- no abd pain; patient lucid; pt has been receiving doses of NAC throughout hospitalization; Case clarified with GI --> rec NAC 9g qd for now; our team is in contact with poison control; trend LFTs.   #Drug induced mood psychosis -- no need for one to one per psych, on seroquel per psych.  #Alcohol abuse -- no s/s of w/d; c/w vitamin supplementation; encourage po intake.   #RPR positive -- will need to clarify if patient was treated prior for syphilis; titer low -- suspect prior treatment or false positive.   Rest of plan as above    Dr. Frank Lugo, DO  Attending Physician  Division of Hospital Medicine  Available via Microsoft Teams (preferred)  Also available via Pager [phone]

## 2021-08-21 NOTE — PROGRESS NOTE ADULT - PROBLEM SELECTOR PLAN 5
Admission EKG with voltage criteria for LVH. Currently no complaints of chest pain, tightness, or SOB.   -Consider Echocardiogram in AM to evaluate for left ventricular hypertrophy. Admission EKG with voltage criteria for LVH. Currently no complaints of chest pain, tightness, or SOB.   optimize bp.

## 2021-08-21 NOTE — PROGRESS NOTE ADULT - ASSESSMENT
25M BIB EMS d/t AMS from intentional OD of Tylenol, Alcohol, Unisom (doxylamine), & Benadryl of unknown quantity. Found to have mild transaminitis with elevated serum acetaminophen, alcohol & positive urine amphetamines. Admitted to MICU for airway protection, NAC infusion, monitoring of mental status & liver function. Poison control following. GI consulted for Tylenol overdose.    #Acetaminophen overdose  - Likely presented within 24h of OD, although do not know exact timing  - Presented to [facility] ~ 1720 8/16  - Tylenol lvl >300 @ 1800 8/16 --> 8 @ 500 8/18  - Initial AST 99, ALT 48, Alk Phos 60, Bili 0.9, INR ~1, Lactate 10 now uptrending  - S/p activated charcoal  - Completed 20h NAC protocol (started 2032 8/16)     --9g IV over first 60min     --3g IV over 4 hrs     --6g IV over 16hrs  - Continue NAC 9g while transaminases are elevated   - Poison control notified, appreciate recommendations  - Anticipate LFTs to continue to rise as delayed response, within 72- 96hr  - [facility] criteria: 0   - Does not meet criteria for referral to liver transplant at this time  - Monitor LFTs, INR, pH, Cr, lactate, phos for re- assessment  - synthetic function is intact at this time    Kasey Johnson MD  PGY-5, Gastroenterology Fellow  pager: [phone]

## 2021-08-21 NOTE — PROGRESS NOTE ADULT - PROBLEM SELECTOR PLAN 1
Hx of intentional acetaminophen, anticholinergic & alcohol OD. Pt was alert on EMS arrival but lost mental status en route to [facility], was A&Ox0 and was intubated, admitted to MICU for airway  monitoring and NAC treatment, now extubated s/p 9g NAC x2, charcoal 50g PO x2. INR1.38.    -Daily BMP to monitor electrolytes and replete as needed  -Trend LFTs, / > /  - Started folic acid 1mg daily  -GI recs:   - Continue NAC 9g daily while LFTs are elevated  - Poison control notified, appreciate recommendations  - Anticipate LFTs to continue to rise as delayed response, within 72- 96hr  - Does not meet criteria for referral to liver transplant at this time  - Monitor LFTs, INR, pH, Cr, lactate, phos for re- assessment Hx of intentional acetaminophen, anticholinergic & alcohol OD. Pt was alert on EMS arrival but lost mental status en route to [facility], was A&Ox0 and was intubated, admitted to MICU for airway  monitoring and NAC treatment, now extubated s/p 9g NAC x2, charcoal 50g PO x2. INR1.38.    -Daily BMP to monitor electrolytes and replete as needed  -Trend LFTs, / > /  - Started folic acid 1mg daily  -GI recs:   - Continue NAC 9g daily while LFTs are elevated. D/w gi.   - Poison control notified, appreciate recommendations  - Anticipate LFTs to continue to rise as delayed response, within 72- 96hr  - Does not meet criteria for referral to liver transplant at this time  - Monitor LFTs, INR, pH, Cr, lactate, phos for re- assessment

## 2021-08-21 NOTE — PROGRESS NOTE ADULT - SUBJECTIVE AND OBJECTIVE BOX
OVERNIGHT EVENTS: NAEO    SUBJECTIVE:  Patient seen and examined at bedside. States he feels better. No nausea, vomiting, visual/auditory hallucinations, denies feeling anxious.    Vital Signs Last 12 Hrs  T(F): 98.2 (08-21-21 @ 14:03), Max: 98.2 (08-21-21 @ 14:03)  HR: 84 (08-21-21 @ 14:03) (84 - 85)  BP: 114/72 (08-21-21 @ 14:03) (110/66 - 114/72)  BP(mean): --  RR: 17 (08-21-21 @ 14:03) (17 - 18)  SpO2: 98% (08-21-21 @ 14:03) (96% - 98%)  I&O's Summary    20 Aug 2021 07:01  -  21 Aug 2021 07:00  --------------------------------------------------------  IN: 522 mL / OUT: 1650 mL / NET: -1128 mL        PHYSICAL EXAM:  Constitutional: NAD, comfortable in bed.  HEENT: NC/AT, PERRLA, EOMI, no conjunctival pallor or scleral icterus, MMM  Neck: Supple, no JVD  Respiratory: CTA B/L. No w/r/r.   Cardiovascular: RRR, normal S1 and S2, no m/r/g.   Gastrointestinal: +BS, soft NTND, no guarding or rebound tenderness, no palpable masses   Extremities: wwp; no cyanosis, clubbing or edema.   Vascular: Pulses equal and strong throughout.   Neurological: AAOx3, no CN deficits, strength and sensation intact throughout.   Skin: No gross skin abnormalities or rashes        LABS:                        12.4   4.38  )-----------( 235      ( 21 Aug 2021 05:50 )             36.5     08-21    136  |  102  |  5<L>  ----------------------------<  103<H>  3.9   |  25  |  0.63    Ca    9.3      21 Aug 2021 05:50  Phos  3.6     08-21  Mg     2.0     08-21    TPro  6.8  /  Alb  3.7  /  TBili  0.6  /  DBili  x   /  AST  1014<H>  /  ALT  802<H>  /  AlkPhos  95  08-21    PT/INR - ( 21 Aug 2021 05:50 )   PT: 12.6 sec;   INR: 1.05          PTT - ( 21 Aug 2021 05:50 )  PTT:39.0 sec        RADIOLOGY & ADDITIONAL TESTS:    MEDICATIONS  (STANDING):  acetylcysteine IVPB 9 Gram(s) IV Intermittent once  enoxaparin Injectable 40 milliGRAM(s) SubCutaneous every 24 hours  folic acid 1 milliGRAM(s) Oral daily  multivitamin 1 Tablet(s) Oral daily  pantoprazole    Tablet 40 milliGRAM(s) Oral at bedtime  QUEtiapine 100 milliGRAM(s) Oral at bedtime  thiamine 100 milliGRAM(s) Oral daily    MEDICATIONS  (PRN):  benzocaine 15 mG/menthol 3.6 mG Lozenge 1 Lozenge Oral two times a day PRN Sore Throat  QUEtiapine 50 milliGRAM(s) Oral every 6 hours PRN agitation   OVERNIGHT EVENTS: NAEO    SUBJECTIVE:  Patient seen and examined at bedside. States he feels better. No nausea, vomiting, visual/auditory hallucinations, denies feeling anxious.    12 point ros negative except for above    Vital Signs Last 12 Hrs  T(F): 98.2 (08-21-21 @ 14:03), Max: 98.2 (08-21-21 @ 14:03)  HR: 84 (08-21-21 @ 14:03) (84 - 85)  BP: 114/72 (08-21-21 @ 14:03) (110/66 - 114/72)  BP(mean): --  RR: 17 (08-21-21 @ 14:03) (17 - 18)  SpO2: 98% (08-21-21 @ 14:03) (96% - 98%)  I&O's Summary    20 Aug 2021 07:01  -  21 Aug 2021 07:00  --------------------------------------------------------  IN: 522 mL / OUT: 1650 mL / NET: -1128 mL        PHYSICAL EXAM:  Constitutional: NAD, comfortable in bed.  HEENT: NC/AT, PERRLA, EOMI, no conjunctival pallor or scleral icterus, MMM  Neck: Supple, no JVD  Respiratory: CTA B/L. No w/r/r.   Cardiovascular: RRR, normal S1 and S2, no m/r/g.   Gastrointestinal: +BS, soft NTND, no guarding or rebound tenderness, no palpable masses   Extremities: wwp; no cyanosis, clubbing or edema.   Vascular: Pulses equal and strong throughout.   Neurological: AAOx3, no CN deficits, strength and sensation intact throughout.   Skin: No gross skin abnormalities or rashes        LABS:                        12.4   4.38  )-----------( 235      ( 21 Aug 2021 05:50 )             36.5     08-21    136  |  102  |  5<L>  ----------------------------<  103<H>  3.9   |  25  |  0.63    Ca    9.3      21 Aug 2021 05:50  Phos  3.6     08-21  Mg     2.0     08-21    TPro  6.8  /  Alb  3.7  /  TBili  0.6  /  DBili  x   /  AST  1014<H>  /  ALT  802<H>  /  AlkPhos  95  08-21    PT/INR - ( 21 Aug 2021 05:50 )   PT: 12.6 sec;   INR: 1.05          PTT - ( 21 Aug 2021 05:50 )  PTT:39.0 sec        RADIOLOGY & ADDITIONAL TESTS:    MEDICATIONS  (STANDING):  acetylcysteine IVPB 9 Gram(s) IV Intermittent once  enoxaparin Injectable 40 milliGRAM(s) SubCutaneous every 24 hours  folic acid 1 milliGRAM(s) Oral daily  multivitamin 1 Tablet(s) Oral daily  pantoprazole    Tablet 40 milliGRAM(s) Oral at bedtime  QUEtiapine 100 milliGRAM(s) Oral at bedtime  thiamine 100 milliGRAM(s) Oral daily    MEDICATIONS  (PRN):  benzocaine 15 mG/menthol 3.6 mG Lozenge 1 Lozenge Oral two times a day PRN Sore Throat  QUEtiapine 50 milliGRAM(s) Oral every 6 hours PRN agitation

## 2021-08-21 NOTE — PROGRESS NOTE ADULT - PROBLEM SELECTOR PLAN 4
primary team RN- who spoke with Dr. Ackerman K 3.9  -Daily BMP to trend potassium  -Replete as needed.

## 2021-08-22 LAB
ALBUMIN SERPL ELPH-MCNC: 4.2 G/DL — SIGNIFICANT CHANGE UP (ref 3.3–5)
ALP SERPL-CCNC: 110 U/L — SIGNIFICANT CHANGE UP (ref 40–120)
ALT FLD-CCNC: 550 U/L — HIGH (ref 10–45)
ANION GAP SERPL CALC-SCNC: 13 MMOL/L — SIGNIFICANT CHANGE UP (ref 5–17)
AST SERPL-CCNC: 229 U/L — HIGH (ref 10–40)
BILIRUB SERPL-MCNC: 0.5 MG/DL — SIGNIFICANT CHANGE UP (ref 0.2–1.2)
BUN SERPL-MCNC: 9 MG/DL — SIGNIFICANT CHANGE UP (ref 7–23)
CALCIUM SERPL-MCNC: 9.6 MG/DL — SIGNIFICANT CHANGE UP (ref 8.4–10.5)
CHLORIDE SERPL-SCNC: 99 MMOL/L — SIGNIFICANT CHANGE UP (ref 96–108)
CO2 SERPL-SCNC: 24 MMOL/L — SIGNIFICANT CHANGE UP (ref 22–31)
CREAT SERPL-MCNC: 0.79 MG/DL — SIGNIFICANT CHANGE UP (ref 0.5–1.3)
CULTURE RESULTS: SIGNIFICANT CHANGE UP
CULTURE RESULTS: SIGNIFICANT CHANGE UP
GLUCOSE SERPL-MCNC: 122 MG/DL — HIGH (ref 70–99)
HCT VFR BLD CALC: 38.8 % — LOW (ref 39–50)
HGB BLD-MCNC: 13.1 G/DL — SIGNIFICANT CHANGE UP (ref 13–17)
MAGNESIUM SERPL-MCNC: 2.3 MG/DL — SIGNIFICANT CHANGE UP (ref 1.6–2.6)
MCHC RBC-ENTMCNC: 33 PG — SIGNIFICANT CHANGE UP (ref 27–34)
MCHC RBC-ENTMCNC: 33.8 GM/DL — SIGNIFICANT CHANGE UP (ref 32–36)
MCV RBC AUTO: 97.7 FL — SIGNIFICANT CHANGE UP (ref 80–100)
NRBC # BLD: 0 /100 WBCS — SIGNIFICANT CHANGE UP (ref 0–0)
PHOSPHATE SERPL-MCNC: 3.8 MG/DL — SIGNIFICANT CHANGE UP (ref 2.5–4.5)
PLATELET # BLD AUTO: 288 K/UL — SIGNIFICANT CHANGE UP (ref 150–400)
POTASSIUM SERPL-MCNC: 4 MMOL/L — SIGNIFICANT CHANGE UP (ref 3.5–5.3)
POTASSIUM SERPL-SCNC: 4 MMOL/L — SIGNIFICANT CHANGE UP (ref 3.5–5.3)
PROT SERPL-MCNC: 7.6 G/DL — SIGNIFICANT CHANGE UP (ref 6–8.3)
RBC # BLD: 3.97 M/UL — LOW (ref 4.2–5.8)
RBC # FLD: 12.3 % — SIGNIFICANT CHANGE UP (ref 10.3–14.5)
SODIUM SERPL-SCNC: 136 MMOL/L — SIGNIFICANT CHANGE UP (ref 135–145)
SPECIMEN SOURCE: SIGNIFICANT CHANGE UP
SPECIMEN SOURCE: SIGNIFICANT CHANGE UP
WBC # BLD: 6.19 K/UL — SIGNIFICANT CHANGE UP (ref 3.8–10.5)
WBC # FLD AUTO: 6.19 K/UL — SIGNIFICANT CHANGE UP (ref 3.8–10.5)

## 2021-08-22 PROCEDURE — 99233 SBSQ HOSP IP/OBS HIGH 50: CPT | Mod: GC

## 2021-08-22 PROCEDURE — 99231 SBSQ HOSP IP/OBS SF/LOW 25: CPT

## 2021-08-22 RX ORDER — ACETYLCYSTEINE 200 MG/ML
9 VIAL (ML) MISCELLANEOUS ONCE
Refills: 0 | Status: COMPLETED | OUTPATIENT
Start: 2021-08-22 | End: 2021-08-22

## 2021-08-22 RX ADMIN — QUETIAPINE FUMARATE 100 MILLIGRAM(S): 200 TABLET, FILM COATED ORAL at 22:23

## 2021-08-22 RX ADMIN — Medication 1 TABLET(S): at 12:44

## 2021-08-22 RX ADMIN — Medication 43.54 GRAM(S): at 09:24

## 2021-08-22 RX ADMIN — Medication 100 MILLIGRAM(S): at 12:44

## 2021-08-22 RX ADMIN — Medication 1 MILLIGRAM(S): at 12:44

## 2021-08-22 RX ADMIN — PANTOPRAZOLE SODIUM 40 MILLIGRAM(S): 20 TABLET, DELAYED RELEASE ORAL at 22:23

## 2021-08-22 NOTE — PROGRESS NOTE ADULT - PROBLEM SELECTOR PLAN 3
15.8 on admission but trending down, pt now with hgb 11.9.   -Trend Hgb with daily CBC     Iron total - 77 normal; Unsaturated Iron Binding Capacity - 109 low; % sat, iron - 41 low; Transferrin - 144 low; Ferritin - 4092 high.

## 2021-08-22 NOTE — PROGRESS NOTE ADULT - PROBLEM SELECTOR PLAN 1
Hx of intentional acetaminophen, anticholinergic & alcohol OD. Pt was alert on EMS arrival but lost mental status en route to [facility], was A&Ox0 and was intubated, admitted to MICU for airway  monitoring and NAC treatment, now extubated s/p 9g NAC x2, charcoal 50g PO x2. INR1.38.    -Daily BMP to monitor electrolytes and replete as needed  -Trend LFTs, / > /  - Started folic acid 1mg daily  -GI recs:   - Continue NAC 9g daily while LFTs are elevated. D/w gi.   - Poison control notified, appreciate recommendations  - Anticipate LFTs to continue to rise as delayed response, within 72- 96hr  - Does not meet criteria for referral to liver transplant at this time  - Monitor LFTs, INR, pH, Cr, lactate, phos for re- assessment.

## 2021-08-22 NOTE — PROGRESS NOTE ADULT - PROBLEM SELECTOR PLAN 5
Admission EKG with voltage criteria for LVH. Currently no complaints of chest pain, tightness, or SOB.   optimize bp.

## 2021-08-22 NOTE — PROGRESS NOTE ADULT - SUBJECTIVE AND OBJECTIVE BOX
OVERNIGHT EVENTS:    SUBJECTIVE / INTERVAL HPI: Patient seen and examined at bedside. He denies N/V, abd pain    MEDICATIONS  (STANDING):  enoxaparin Injectable 40 milliGRAM(s) SubCutaneous every 24 hours  folic acid 1 milliGRAM(s) Oral daily  multivitamin 1 Tablet(s) Oral daily  pantoprazole    Tablet 40 milliGRAM(s) Oral at bedtime  QUEtiapine 100 milliGRAM(s) Oral at bedtime  thiamine 100 milliGRAM(s) Oral daily    MEDICATIONS  (PRN):  benzocaine 15 mG/menthol 3.6 mG Lozenge 1 Lozenge Oral two times a day PRN Sore Throat  QUEtiapine 50 milliGRAM(s) Oral every 6 hours PRN agitation    Allergies    Allergy Status Unknown    Intolerances        VITAL SIGNS:  Vital Signs Last 24 Hrs  T(C): 36.3 (22 Aug 2021 06:05), Max: 36.8 (21 Aug 2021 14:03)  T(F): 97.3 (22 Aug 2021 06:05), Max: 98.2 (21 Aug 2021 14:03)  HR: 91 (22 Aug 2021 06:05) (74 - 91)  BP: 113/77 (22 Aug 2021 06:05) (113/77 - 123/80)  BP(mean): --  RR: 18 (22 Aug 2021 06:05) (17 - 18)  SpO2: 97% (22 Aug 2021 06:05) (97% - 98%)      08-21-21 @ 07:01  -  08-22-21 @ 07:00  --------------------------------------------------------  IN: 0 mL / OUT: 1300 mL / NET: -1300 mL        PHYSICAL EXAM:  General: NAD, Laying comfortably in bed  HEENT: NC/AT, anicteric sclera, MMM  Neck: supple  Cardiovascular: +S1/S2, RRR, No murmurs, rubs, gallops  Respiratory: CTA B/L, no W/R/R  Gastrointestinal: soft, NT/ND, +BSx4  Extremities: WWP, no edema, clubbing or cyanosis  Vascular: 2+ radial, DP/PT pulses B/L  Neurological: AAOx3, no focal deficits      LABS:                        13.1   6.19  )-----------( 288      ( 22 Aug 2021 08:33 )             38.8     08-22    136  |  99  |  9   ----------------------------<  122<H>  4.0   |  24  |  0.79    Ca    9.6      22 Aug 2021 08:33  Phos  3.8     08-22  Mg     2.3     08-22    TPro  7.6  /  Alb  4.2  /  TBili  0.5  /  DBili  x   /  AST  229<H>  /  ALT  550<H>  /  AlkPhos  110  08-22    PT/INR - ( 21 Aug 2021 05:50 )   PT: 12.6 sec;   INR: 1.05          PTT - ( 21 Aug 2021 05:50 )  PTT:39.0 sec    CAPILLARY BLOOD GLUCOSE              RADIOLOGY & ADDITIONAL TESTS: Reviewed. OVERNIGHT EVENTS:    SUBJECTIVE / INTERVAL HPI: Patient seen and examined at bedside. He denies N/V, abd pain    12 point ros negative except for above    MEDICATIONS  (STANDING):  enoxaparin Injectable 40 milliGRAM(s) SubCutaneous every 24 hours  folic acid 1 milliGRAM(s) Oral daily  multivitamin 1 Tablet(s) Oral daily  pantoprazole    Tablet 40 milliGRAM(s) Oral at bedtime  QUEtiapine 100 milliGRAM(s) Oral at bedtime  thiamine 100 milliGRAM(s) Oral daily    MEDICATIONS  (PRN):  benzocaine 15 mG/menthol 3.6 mG Lozenge 1 Lozenge Oral two times a day PRN Sore Throat  QUEtiapine 50 milliGRAM(s) Oral every 6 hours PRN agitation    Allergies    Allergy Status Unknown    Intolerances        VITAL SIGNS:  Vital Signs Last 24 Hrs  T(C): 36.3 (22 Aug 2021 06:05), Max: 36.8 (21 Aug 2021 14:03)  T(F): 97.3 (22 Aug 2021 06:05), Max: 98.2 (21 Aug 2021 14:03)  HR: 91 (22 Aug 2021 06:05) (74 - 91)  BP: 113/77 (22 Aug 2021 06:05) (113/77 - 123/80)  BP(mean): --  RR: 18 (22 Aug 2021 06:05) (17 - 18)  SpO2: 97% (22 Aug 2021 06:05) (97% - 98%)      08-21-21 @ 07:01  -  08-22-21 @ 07:00  --------------------------------------------------------  IN: 0 mL / OUT: 1300 mL / NET: -1300 mL        PHYSICAL EXAM:  General: NAD, Laying comfortably in bed  HEENT: NC/AT, anicteric sclera, MMM  Neck: supple  Cardiovascular: +S1/S2, RRR, No murmurs, rubs, gallops  Respiratory: CTA B/L, no W/R/R  Gastrointestinal: soft, NT/ND, +BSx4  Extremities: WWP, no edema, clubbing or cyanosis  Vascular: 2+ radial, DP/PT pulses B/L  Neurological: AAOx3, no focal deficits      LABS:                        13.1   6.19  )-----------( 288      ( 22 Aug 2021 08:33 )             38.8     08-22    136  |  99  |  9   ----------------------------<  122<H>  4.0   |  24  |  0.79    Ca    9.6      22 Aug 2021 08:33  Phos  3.8     08-22  Mg     2.3     08-22    TPro  7.6  /  Alb  4.2  /  TBili  0.5  /  DBili  x   /  AST  229<H>  /  ALT  550<H>  /  AlkPhos  110  08-22    PT/INR - ( 21 Aug 2021 05:50 )   PT: 12.6 sec;   INR: 1.05          PTT - ( 21 Aug 2021 05:50 )  PTT:39.0 sec    CAPILLARY BLOOD GLUCOSE              RADIOLOGY & ADDITIONAL TESTS: Reviewed. OVERNIGHT EVENTS: NO AOE    SUBJECTIVE / INTERVAL HPI: Patient seen and examined at bedside. He denies N/V, abd pain. He feels a bit anxious regarding how he will pay his hospital bills. He denies fever/chills, myalgia, lightheadedness, CP, SOB, palpitations or anxiety.     12 point ros negative except for above    MEDICATIONS  (STANDING):  enoxaparin Injectable 40 milliGRAM(s) SubCutaneous every 24 hours  folic acid 1 milliGRAM(s) Oral daily  multivitamin 1 Tablet(s) Oral daily  pantoprazole    Tablet 40 milliGRAM(s) Oral at bedtime  QUEtiapine 100 milliGRAM(s) Oral at bedtime  thiamine 100 milliGRAM(s) Oral daily    MEDICATIONS  (PRN):  benzocaine 15 mG/menthol 3.6 mG Lozenge 1 Lozenge Oral two times a day PRN Sore Throat  QUEtiapine 50 milliGRAM(s) Oral every 6 hours PRN agitation    Allergies    Allergy Status Unknown    Intolerances        VITAL SIGNS:  Vital Signs Last 24 Hrs  T(C): 36.3 (22 Aug 2021 06:05), Max: 36.8 (21 Aug 2021 14:03)  T(F): 97.3 (22 Aug 2021 06:05), Max: 98.2 (21 Aug 2021 14:03)  HR: 91 (22 Aug 2021 06:05) (74 - 91)  BP: 113/77 (22 Aug 2021 06:05) (113/77 - 123/80)  BP(mean): --  RR: 18 (22 Aug 2021 06:05) (17 - 18)  SpO2: 97% (22 Aug 2021 06:05) (97% - 98%)      08-21-21 @ 07:01  -  08-22-21 @ 07:00  --------------------------------------------------------  IN: 0 mL / OUT: 1300 mL / NET: -1300 mL        PHYSICAL EXAM:  Constitutional: NAD, comfortable in bed.  HEENT: NC/AT, PERRLA, EOMI, no conjunctival pallor or scleral icterus, MMM  Neck: Supple, no JVD  Respiratory: CTA B/L. No w/r/r.   Cardiovascular: RRR, normal S1 and S2, no m/r/g.   Gastrointestinal: +BS, soft NTND, no guarding or rebound tenderness, no palpable masses   Extremities: wwp; no cyanosis, clubbing or edema.   Vascular: Pulses equal and strong throughout.   Neurological: AAOx3, no CN deficits, strength and sensation intact throughout.   Skin: No gross skin abnormalities or rashes    LABS:                        13.1   6.19  )-----------( 288      ( 22 Aug 2021 08:33 )             38.8     08-22    136  |  99  |  9   ----------------------------<  122<H>  4.0   |  24  |  0.79    Ca    9.6      22 Aug 2021 08:33  Phos  3.8     08-22  Mg     2.3     08-22    TPro  7.6  /  Alb  4.2  /  TBili  0.5  /  DBili  x   /  AST  229<H>  /  ALT  550<H>  /  AlkPhos  110  08-22    PT/INR - ( 21 Aug 2021 05:50 )   PT: 12.6 sec;   INR: 1.05          PTT - ( 21 Aug 2021 05:50 )  PTT:39.0 sec    CAPILLARY BLOOD GLUCOSE              RADIOLOGY & ADDITIONAL TESTS: Reviewed.

## 2021-08-22 NOTE — PROGRESS NOTE ADULT - SUBJECTIVE AND OBJECTIVE BOX
GASTROENTEROLOGY PROGRESS NOTE  Patient seen and examined at bedside. No acute events. Feels well, denies n/v, abd pain.     PERTINENT REVIEW OF SYSTEMS:  CONSTITUTIONAL: No weakness, fevers or chills  HEENT: No visual changes; No vertigo or throat pain   GASTROINTESTINAL: As above.  NEUROLOGICAL: No numbness or weakness  SKIN: No itching, burning, rashes, or lesions     Allergies    Allergy Status Unknown    Intolerances      MEDICATIONS:  MEDICATIONS  (STANDING):  enoxaparin Injectable 40 milliGRAM(s) SubCutaneous every 24 hours  folic acid 1 milliGRAM(s) Oral daily  multivitamin 1 Tablet(s) Oral daily  pantoprazole    Tablet 40 milliGRAM(s) Oral at bedtime  QUEtiapine 100 milliGRAM(s) Oral at bedtime  thiamine 100 milliGRAM(s) Oral daily    MEDICATIONS  (PRN):  benzocaine 15 mG/menthol 3.6 mG Lozenge 1 Lozenge Oral two times a day PRN Sore Throat  QUEtiapine 50 milliGRAM(s) Oral every 6 hours PRN agitation    Vital Signs Last 24 Hrs  T(C): 36.9 (22 Aug 2021 13:35), Max: 36.9 (22 Aug 2021 13:35)  T(F): 98.4 (22 Aug 2021 13:35), Max: 98.4 (22 Aug 2021 13:35)  HR: 82 (22 Aug 2021 13:35) (74 - 91)  BP: 121/75 (22 Aug 2021 13:35) (113/77 - 123/80)  BP(mean): --  RR: 17 (22 Aug 2021 13:35) (17 - 18)  SpO2: 98% (22 Aug 2021 13:35) (97% - 98%)    08-21 @ 07:01  -  08-22 @ 07:00  --------------------------------------------------------  IN: 0 mL / OUT: 1300 mL / NET: -1300 mL      PHYSICAL EXAM:    General: in no acute distress  HEENT: MMM, conjunctiva and sclera clear  Gastrointestinal: Soft non-tender non-distended; No rebound or guarding  Skin: Warm and dry. No obvious rash    LABS:                        13.1   6.19  )-----------( 288      ( 22 Aug 2021 08:33 )             38.8     08-22    136  |  99  |  9   ----------------------------<  122<H>  4.0   |  24  |  0.79    Ca    9.6      22 Aug 2021 08:33  Phos  3.8     08-22  Mg     2.3     08-22    TPro  7.6  /  Alb  4.2  /  TBili  0.5  /  DBili  x   /  AST  229<H>  /  ALT  550<H>  /  AlkPhos  110  08-22    PT/INR - ( 21 Aug 2021 05:50 )   PT: 12.6 sec;   INR: 1.05          PTT - ( 21 Aug 2021 05:50 )  PTT:39.0 sec                  RADIOLOGY & ADDITIONAL STUDIES:  Reviewed

## 2021-08-22 NOTE — PROGRESS NOTE ADULT - ASSESSMENT
25M BIB EMS d/t AMS from intentional OD of Tylenol, Alcohol, Unisom (doxylamine), & Benadryl of unknown quantity. Found to have mild transaminitis with elevated serum acetaminophen, alcohol & positive urine amphetamines. Admitted to MICU for airway protection, NAC infusion, monitoring of mental status & liver function. Poison control following. GI consulted for Tylenol overdose.    #Acetaminophen overdose  - Likely presented within 24h of OD, although do not know exact timing  - Presented to [facility] ~ 1720 8/16  - Tylenol lvl >300 @ 1800 8/16 --> 8 @ 500 8/18  - Initial AST 99, ALT 48, Alk Phos 60, Bili 0.9, INR ~1, Lactate 10 now uptrending  - S/p activated charcoal  - Completed 20h NAC protocol (started 2032 8/16)     --9g IV over first 60min     --3g IV over 4 hrs     --6g IV over 16hrs  - Continue NAC 9g while transaminases are elevated; would complete current dose of NAC and pending tomorrow AM labs, would discontinue as long as transaminases continue to normalize   - Poison control notified, appreciate recommendations  - Anticipate LFTs to continue to rise as delayed response, within 72- 96hr  - [facility] [facility] criteria: 0   - Does not meet criteria for referral to liver transplant at this time  - Monitor LFTs, INR, pH, Cr, lactate, phos for re- assessment  - synthetic function is intact at this time    Thank you for allowing us to participate in the care of this patient.  GI will sign off. Please call back with any questions or concerns.     Kasey Johnson MD  PGY-5, Gastroenterology Fellow  pager: [phone]

## 2021-08-22 NOTE — PROGRESS NOTE ADULT - ATTENDING COMMENTS
I agree with the above findings, assessment, and plan with the following additions and exceptions:     In brief, this is a 24 y/o M with a PMHx of alcohol abuse (no known history of withdrawal) admitted to ICU for intubation s/p medication overdose in setting of polysubstance use including crystal meth and presumably Tylenol containing narcotics.  #Acute liver injury 2/2 tylenol OD -- no abd pain; patient lucid; pt has been receiving doses of NAC throughout hospitalization; Case clarified with GI --> rec NAC 9g qd for now; our team is in contact with poison control; trend LFTs.   #Drug induced mood psychosis -- no need for one to one per psych, on seroquel per psych; monitor qtc.  #Alcohol abuse -- no s/s of w/d; c/w vitamin supplementation; encourage po intake.   #RPR positive -- will need to clarify if patient was treated prior for syphilis; titer low -- patient may have had prior treatment or perhaps this is a false positive. Repeat ordered. Lab to be called to ordered FTA-ABS if possible.   Rest of plan as above    Dr. Frank Lugo, DO  Attending Physician  Division of Hospital Medicine  Available via Microsoft Teams (preferred)  Also available via Pager [phone]

## 2021-08-22 NOTE — PROGRESS NOTE ADULT - PROBLEM SELECTOR PLAN 8
F: None   E: Replete as necessary K>4 Mg>2  N: Normal diet  DVT Prophylaxis: Lovenox 40mg SQ q24h  GI prophylaxis: Protonix 40mg IVP q24h   CODE STATUS: FULL  Dispo: Home.

## 2021-08-22 NOTE — PROGRESS NOTE ADULT - ASSESSMENT
25M BIB d/t AMS from intentional alcohol & drug OD of doxylamine, Tylenol, & Benadryl, admitted to MICU for airway protection, NAC infusion,, now s/p extubation, at 7-uris for further monitoring of his LFTs, CK; psych following, on NAC infusion

## 2021-08-22 NOTE — PROGRESS NOTE ADULT - ATTENDING COMMENTS
#Acetaminophen overdose  - Likely presented within 24h of OD, although do not know exact timing  - Presented to [facility] ~ 1720 8/16  - Tylenol lvl >300 @ 1800 8/16 --> 8 @ 500 8/18  - Initial AST 99, ALT 48, Alk Phos 60, Bili 0.9, INR ~1, Lactate 10 now uptrending  - S/p activated charcoal  - Completed 20h NAC protocol (started 2032 8/16)     --9g IV over first 60min     --3g IV over 4 hrs     --6g IV over 16hrs  - Continue NAC 9g while transaminases are elevated; would complete current dose of NAC and pending tomorrow AM labs, would discontinue as long as transaminases continue to normalize   - Poison control notified, appreciate recommendations  - Anticipate LFTs to continue to rise as delayed response, within 72- 96hr  - [employer] criteria: 0   - Does not meet criteria for referral to liver transplant at this time  - Monitor LFTs, INR, pH, Cr, lactate, phos for re- assessment  - synthetic function is intact at this time

## 2021-08-23 ENCOUNTER — TRANSCRIPTION ENCOUNTER (OUTPATIENT)
Age: 26
End: 2021-08-23

## 2021-08-23 VITALS
SYSTOLIC BLOOD PRESSURE: 120 MMHG | TEMPERATURE: 98 F | OXYGEN SATURATION: 96 % | DIASTOLIC BLOOD PRESSURE: 77 MMHG | HEART RATE: 86 BPM | RESPIRATION RATE: 18 BRPM

## 2021-08-23 LAB
ALBUMIN SERPL ELPH-MCNC: 4.3 G/DL — SIGNIFICANT CHANGE UP (ref 3.3–5)
ALP SERPL-CCNC: 104 U/L — SIGNIFICANT CHANGE UP (ref 40–120)
ALT FLD-CCNC: 422 U/L — HIGH (ref 10–45)
ANION GAP SERPL CALC-SCNC: 11 MMOL/L — SIGNIFICANT CHANGE UP (ref 5–17)
APTT BLD: 37.8 SEC — HIGH (ref 27.5–35.5)
AST SERPL-CCNC: 122 U/L — HIGH (ref 10–40)
BILIRUB SERPL-MCNC: 0.5 MG/DL — SIGNIFICANT CHANGE UP (ref 0.2–1.2)
BUN SERPL-MCNC: 12 MG/DL — SIGNIFICANT CHANGE UP (ref 7–23)
CALCIUM SERPL-MCNC: 9.8 MG/DL — SIGNIFICANT CHANGE UP (ref 8.4–10.5)
CHLORIDE SERPL-SCNC: 101 MMOL/L — SIGNIFICANT CHANGE UP (ref 96–108)
CO2 SERPL-SCNC: 25 MMOL/L — SIGNIFICANT CHANGE UP (ref 22–31)
CREAT SERPL-MCNC: 0.75 MG/DL — SIGNIFICANT CHANGE UP (ref 0.5–1.3)
GLUCOSE SERPL-MCNC: 104 MG/DL — HIGH (ref 70–99)
HCT VFR BLD CALC: 37 % — LOW (ref 39–50)
HGB BLD-MCNC: 12.1 G/DL — LOW (ref 13–17)
INR BLD: 1.08 — SIGNIFICANT CHANGE UP (ref 0.88–1.16)
MAGNESIUM SERPL-MCNC: 2.4 MG/DL — SIGNIFICANT CHANGE UP (ref 1.6–2.6)
MCHC RBC-ENTMCNC: 32.7 GM/DL — SIGNIFICANT CHANGE UP (ref 32–36)
MCHC RBC-ENTMCNC: 32.8 PG — SIGNIFICANT CHANGE UP (ref 27–34)
MCV RBC AUTO: 100.3 FL — HIGH (ref 80–100)
NRBC # BLD: 0 /100 WBCS — SIGNIFICANT CHANGE UP (ref 0–0)
PHOSPHATE SERPL-MCNC: 3.3 MG/DL — SIGNIFICANT CHANGE UP (ref 2.5–4.5)
PLATELET # BLD AUTO: 335 K/UL — SIGNIFICANT CHANGE UP (ref 150–400)
POTASSIUM SERPL-MCNC: 4.4 MMOL/L — SIGNIFICANT CHANGE UP (ref 3.5–5.3)
POTASSIUM SERPL-SCNC: 4.4 MMOL/L — SIGNIFICANT CHANGE UP (ref 3.5–5.3)
PROT SERPL-MCNC: 7.7 G/DL — SIGNIFICANT CHANGE UP (ref 6–8.3)
PROTHROM AB SERPL-ACNC: 12.9 SEC — SIGNIFICANT CHANGE UP (ref 10.6–13.6)
RBC # BLD: 3.69 M/UL — LOW (ref 4.2–5.8)
RBC # FLD: 12.5 % — SIGNIFICANT CHANGE UP (ref 10.3–14.5)
SODIUM SERPL-SCNC: 137 MMOL/L — SIGNIFICANT CHANGE UP (ref 135–145)
WBC # BLD: 6.62 K/UL — SIGNIFICANT CHANGE UP (ref 3.8–10.5)
WBC # FLD AUTO: 6.62 K/UL — SIGNIFICANT CHANGE UP (ref 3.8–10.5)

## 2021-08-23 PROCEDURE — 85025 COMPLETE CBC W/AUTO DIFF WBC: CPT

## 2021-08-23 PROCEDURE — 71045 X-RAY EXAM CHEST 1 VIEW: CPT

## 2021-08-23 PROCEDURE — 85027 COMPLETE CBC AUTOMATED: CPT

## 2021-08-23 PROCEDURE — 86592 SYPHILIS TEST NON-TREP QUAL: CPT

## 2021-08-23 PROCEDURE — 87591 N.GONORRHOEAE DNA AMP PROB: CPT

## 2021-08-23 PROCEDURE — 31500 INSERT EMERGENCY AIRWAY: CPT

## 2021-08-23 PROCEDURE — 36415 COLL VENOUS BLD VENIPUNCTURE: CPT

## 2021-08-23 PROCEDURE — 84295 ASSAY OF SERUM SODIUM: CPT

## 2021-08-23 PROCEDURE — 80307 DRUG TEST PRSMV CHEM ANLYZR: CPT

## 2021-08-23 PROCEDURE — 80053 COMPREHEN METABOLIC PANEL: CPT

## 2021-08-23 PROCEDURE — 86850 RBC ANTIBODY SCREEN: CPT

## 2021-08-23 PROCEDURE — 84466 ASSAY OF TRANSFERRIN: CPT

## 2021-08-23 PROCEDURE — 96376 TX/PRO/DX INJ SAME DRUG ADON: CPT

## 2021-08-23 PROCEDURE — 96374 THER/PROPH/DIAG INJ IV PUSH: CPT

## 2021-08-23 PROCEDURE — 93005 ELECTROCARDIOGRAM TRACING: CPT

## 2021-08-23 PROCEDURE — 83735 ASSAY OF MAGNESIUM: CPT

## 2021-08-23 PROCEDURE — 86901 BLOOD TYPING SEROLOGIC RH(D): CPT

## 2021-08-23 PROCEDURE — 86769 SARS-COV-2 COVID-19 ANTIBODY: CPT

## 2021-08-23 PROCEDURE — 94002 VENT MGMT INPAT INIT DAY: CPT

## 2021-08-23 PROCEDURE — 43753 TX GASTRO INTUB W/ASP: CPT

## 2021-08-23 PROCEDURE — 83605 ASSAY OF LACTIC ACID: CPT

## 2021-08-23 PROCEDURE — 84132 ASSAY OF SERUM POTASSIUM: CPT

## 2021-08-23 PROCEDURE — 83036 HEMOGLOBIN GLYCOSYLATED A1C: CPT

## 2021-08-23 PROCEDURE — 85730 THROMBOPLASTIN TIME PARTIAL: CPT

## 2021-08-23 PROCEDURE — 83540 ASSAY OF IRON: CPT

## 2021-08-23 PROCEDURE — 86900 BLOOD TYPING SEROLOGIC ABO: CPT

## 2021-08-23 PROCEDURE — 82962 GLUCOSE BLOOD TEST: CPT

## 2021-08-23 PROCEDURE — 84100 ASSAY OF PHOSPHORUS: CPT

## 2021-08-23 PROCEDURE — 87491 CHLMYD TRACH DNA AMP PROBE: CPT

## 2021-08-23 PROCEDURE — 82330 ASSAY OF CALCIUM: CPT

## 2021-08-23 PROCEDURE — 85610 PROTHROMBIN TIME: CPT

## 2021-08-23 PROCEDURE — 87635 SARS-COV-2 COVID-19 AMP PRB: CPT

## 2021-08-23 PROCEDURE — 86780 TREPONEMA PALLIDUM: CPT

## 2021-08-23 PROCEDURE — 84484 ASSAY OF TROPONIN QUANT: CPT

## 2021-08-23 PROCEDURE — 86803 HEPATITIS C AB TEST: CPT

## 2021-08-23 PROCEDURE — 99291 CRITICAL CARE FIRST HOUR: CPT | Mod: 25

## 2021-08-23 PROCEDURE — 86593 SYPHILIS TEST NON-TREP QUANT: CPT

## 2021-08-23 PROCEDURE — 99233 SBSQ HOSP IP/OBS HIGH 50: CPT | Mod: GC

## 2021-08-23 PROCEDURE — 83550 IRON BINDING TEST: CPT

## 2021-08-23 PROCEDURE — 82550 ASSAY OF CK (CPK): CPT

## 2021-08-23 PROCEDURE — 82803 BLOOD GASES ANY COMBINATION: CPT

## 2021-08-23 PROCEDURE — 81001 URINALYSIS AUTO W/SCOPE: CPT

## 2021-08-23 PROCEDURE — 87040 BLOOD CULTURE FOR BACTERIA: CPT

## 2021-08-23 PROCEDURE — 96375 TX/PRO/DX INJ NEW DRUG ADDON: CPT

## 2021-08-23 PROCEDURE — 82728 ASSAY OF FERRITIN: CPT

## 2021-08-23 PROCEDURE — 70450 CT HEAD/BRAIN W/O DYE: CPT

## 2021-08-23 PROCEDURE — 87389 HIV-1 AG W/HIV-1&-2 AB AG IA: CPT

## 2021-08-23 RX ORDER — ACETYLCYSTEINE 200 MG/ML
9 VIAL (ML) MISCELLANEOUS ONCE
Refills: 0 | Status: COMPLETED | OUTPATIENT
Start: 2021-08-23 | End: 2021-08-23

## 2021-08-23 RX ORDER — QUETIAPINE FUMARATE 200 MG/1
1 TABLET, FILM COATED ORAL
Qty: 7 | Refills: 0
Start: 2021-08-23 | End: 2021-08-29

## 2021-08-23 RX ORDER — QUETIAPINE FUMARATE 200 MG/1
1 TABLET, FILM COATED ORAL
Qty: 28 | Refills: 0
Start: 2021-08-23 | End: 2021-08-29

## 2021-08-23 RX ADMIN — Medication 1 TABLET(S): at 11:34

## 2021-08-23 RX ADMIN — Medication 1 MILLIGRAM(S): at 11:34

## 2021-08-23 RX ADMIN — Medication 43.54 GRAM(S): at 10:48

## 2021-08-23 RX ADMIN — Medication 100 MILLIGRAM(S): at 11:34

## 2021-08-23 NOTE — DISCHARGE NOTE NURSING/CASE MANAGEMENT/SOCIAL WORK - PATIENT PORTAL LINK FT
You can access the FollowMyHealth Patient Portal offered by [facility] by registering at the following website: [email or web address]. By joining [employer]’s FollowMyHealth portal, you will also be able to view your health information using other applications (apps) compatible with our system.

## 2021-08-23 NOTE — DISCHARGE NOTE NURSING/CASE MANAGEMENT/SOCIAL WORK - NSDCPETBCESMAN_GEN_ALL_CORE
If you are a smoker, it is important for your health to stop smoking. Please be aware that second hand smoke is also harmful.
I have reviewed and confirmed nurses' notes for patient's medications, allergies, medical history, and surgical history.

## 2021-08-23 NOTE — PROGRESS NOTE ADULT - REASON FOR ADMISSION
AMS 2/2 reported drug OD

## 2021-08-23 NOTE — PROGRESS NOTE ADULT - PROVIDER SPECIALTY LIST ADULT
Gastroenterology
MICU
Gastroenterology
Hospitalist
Internal Medicine
MICU
Gastroenterology
Internal Medicine
MICU
Gastroenterology
Gastroenterology

## 2021-08-24 PROBLEM — Z00.00 ENCOUNTER FOR PREVENTIVE HEALTH EXAMINATION: Status: ACTIVE | Noted: 2021-08-24

## 2021-08-25 LAB
RPR SER-TITR: (no result)
RPR SERPL-ACNC: REACTIVE
T PALLIDUM AB TITR SER: POSITIVE

## 2021-08-26 ENCOUNTER — NON-APPOINTMENT (OUTPATIENT)
Age: 26
End: 2021-08-26

## 2021-08-30 ENCOUNTER — APPOINTMENT (OUTPATIENT)
Dept: INTERNAL MEDICINE | Facility: CLINIC | Age: 26
End: 2021-08-30

## 2021-10-22 ENCOUNTER — APPOINTMENT (OUTPATIENT)
Dept: INTERNAL MEDICINE | Facility: CLINIC | Age: 26
End: 2021-10-22
Payer: MEDICAID

## 2021-10-22 ENCOUNTER — NON-APPOINTMENT (OUTPATIENT)
Age: 26
End: 2021-10-22

## 2021-10-22 VITALS
HEIGHT: 68 IN | OXYGEN SATURATION: 100 % | TEMPERATURE: 98.2 F | SYSTOLIC BLOOD PRESSURE: 121 MMHG | BODY MASS INDEX: 18.19 KG/M2 | RESPIRATION RATE: 14 BRPM | HEART RATE: 70 BPM | WEIGHT: 120 LBS | DIASTOLIC BLOOD PRESSURE: 82 MMHG

## 2021-10-22 DIAGNOSIS — Z00.01 ENCOUNTER FOR GENERAL ADULT MEDICAL EXAMINATION WITH ABNORMAL FINDINGS: ICD-10-CM

## 2021-10-22 DIAGNOSIS — K71.9 TOXIC LIVER DISEASE, UNSPECIFIED: ICD-10-CM

## 2021-10-22 DIAGNOSIS — D64.9 ANEMIA, UNSPECIFIED: ICD-10-CM

## 2021-10-22 DIAGNOSIS — F19.90 OTHER PSYCHOACTIVE SUBSTANCE USE, UNSPECIFIED, UNCOMPLICATED: ICD-10-CM

## 2021-10-22 DIAGNOSIS — Z78.9 OTHER SPECIFIED HEALTH STATUS: ICD-10-CM

## 2021-10-22 DIAGNOSIS — R79.89 OTHER SPECIFIED ABNORMAL FINDINGS OF BLOOD CHEMISTRY: ICD-10-CM

## 2021-10-22 PROCEDURE — 99203 OFFICE O/P NEW LOW 30 MIN: CPT | Mod: GC,25

## 2021-10-22 PROCEDURE — 99385 PREV VISIT NEW AGE 18-39: CPT | Mod: 25

## 2021-10-22 NOTE — PHYSICAL EXAM
[Normal Sclera/Conjunctiva] : normal sclera/conjunctiva [Normal Outer Ear/Nose] : the outer ears and nose were normal in appearance [No Lymphadenopathy] : no lymphadenopathy [Supple] : supple [No Edema] : there was no peripheral edema [Normal] : no rash [Coordination Grossly Intact] : coordination grossly intact [No Focal Deficits] : no focal deficits [Normal Gait] : normal gait [Normal Affect] : the affect was normal [Normal Insight/Judgement] : insight and judgment were intact

## 2021-10-22 NOTE — REVIEW OF SYSTEMS
[Insomnia] : insomnia [Anxiety] : anxiety [Depression] : depression [Negative] : Heme/Lymph [Fever] : no fever [Chills] : no chills [Fatigue] : no fatigue [Night Sweats] : no night sweats [Recent Change In Weight] : ~T no recent weight change [Vision Problems] : no vision problems [Sore Throat] : no sore throat [Chest Pain] : no chest pain [Palpitations] : no palpitations [Lower Ext Edema] : no lower extremity edema [Shortness Of Breath] : no shortness of breath [Cough] : no cough [Dyspnea on Exertion] : not dyspnea on exertion [Abdominal Pain] : no abdominal pain [Nausea] : no nausea [Diarrhea] : no diarrhea [Vomiting] : no vomiting [Melena] : no melena [Dysuria] : no dysuria [Hematuria] : no hematuria [Joint Pain] : no joint pain [Back Pain] : no back pain [Skin Rash] : no skin rash [Dizziness] : no dizziness [Suicidal] : not suicidal

## 2021-10-22 NOTE — ASSESSMENT
[FreeTextEntry1] : Pt is a 27 y/o w/ hx of alcohol and drug overdose in 8/2021 presenting for initial visit to establish care. \par \par #Substance use disorder\par - pt with intentional overdose of alcohol, tylenol, doxylamine, and Benadryl 8/2021\par - pt reports drinking 3 glasses of wine on the weekends\par - SBIRT referral \par - pt to find a psychiatrist \par - can consider medications for alcohol use disorder, f/u in 6 weeks\par \par #DILI\par - recent overdose of alcohol, tylenol, doxylamine, and benadryl\par - s/p NAC protocol in hospital. Discharge ALT//422\par - repeat CMP, pt will come either Monday or Wednesday next week for blood work\par \par #Depression\par - pt with positive PHQ-9\par - pt in process of establishing care with a psychiatrist\par - can consider referral for CBT vs ssri\par - Pt denies any active SI or HI. Lives with roommates and feels safe. States he has good support, sister visits frequently. \par \par #Anemia\par -  Hb 12.1 on discharge, \par -  repeat CBC, repeat ferritin however likely elevated as acute phase reactant  \par \par #Positive RPR\par - previously treated 2 years ago for syphilis\par \par #HCM\par - pt will get flu vaccine at pharmacy \par - RTC in 6 weeks for f/u.

## 2021-10-22 NOTE — HEALTH RISK ASSESSMENT
[2 - 3 times a week (3 pts)] : 2 - 3  times a week (3 points) [3 or 4 (1 pt)] : 3 or 4  (1 point) [HIV Test offered] : HIV Test offered [Employed] : employed [Student] : student [Single] : single [Less than monthly (1 pt)] : Less than monthly (1 point) [Yes] : In the past 12 months have you used drugs other than those required for medical reasons? Yes [1] : 2) Feeling down, depressed, or hopeless for several days (1) [PHQ-9 Positive] : PHQ-9 Positive [] : No [Audit-CScore] : 5 [[id number]] : 2 [Sexually Active] : not sexually active [HIVDate] : 8/2021 [HIVComments] : negative [FreeTextEntry2] :

## 2021-10-22 NOTE — END OF VISIT
[] : Resident [FreeTextEntry3] :  26 year old M presenting to establish care. Was admitted to [facility] 8/2021 for intentional overdose of alcohol, acetaminophen, other substances. Was briefly intubated, treated w/ NAC. Denies SI/HI since that time, has not yet established /w psych due to prior lack of insurance, is insured now. Spoke w/ someone at [facility] previously, does not believe it would be a good fit for him. Amenable to SBIRT referral to connect pt w/ [facility] and mental health services. Will check labs today to follow up multiple abnormalities including elevated AST/ALT, ferritin in the setting of acetaminophen overdose. Of note, low titer +RPR noted during hospitalization, pt reports completion of treatment for syphilis about 3 years ago. RTC  6 weeks with Dr. Alvarez for f/u above or earlier prn.

## 2021-10-22 NOTE — HISTORY OF PRESENT ILLNESS
[FreeTextEntry1] : establish care [de-identified] : Pt is a 25 y/o w/ hx of alcohol and drug overdose in 8/2021 presenting for initial visit to establish care. Pt reports he does drink drinking still drink alcohol on the weekends about 3 glasses of wine on Sat and Sun. Before his admission, he stats he was drinking every day. Pt reports he was discharged on seroquel 100 mg QD for a week insomnia but has not followed up with a psychiatrist yet due to insurance issues. Denies any illicit drug use. Pt denies any thoughts of hurting himself or hurting others, works as a  and is a student. Pt does report insomnia due to anxiety due to life in general and also his recent admission to the hospital. He states his mood has been up and down, he currently lives with roommates and feels safe. He states he has multiple friends that also live in his building. He states his sister visits him often. He works as a  and is also a student.

## 2021-10-28 ENCOUNTER — APPOINTMENT (OUTPATIENT)
Dept: INTERNAL MEDICINE | Facility: CLINIC | Age: 26
End: 2021-10-28

## 2021-12-02 ENCOUNTER — APPOINTMENT (OUTPATIENT)
Dept: INTERNAL MEDICINE | Facility: CLINIC | Age: 26
End: 2021-12-02

## 2023-11-13 NOTE — CONSULT NOTE ADULT - CONSULT REQUESTED BY NAME
Caller: FIDELIA MARIANO    Relationship: Emergency Contact    Best call back number: [phone]    Requested Prescriptions:   Requested Prescriptions     Pending Prescriptions Disp Refills    Magnesium Oxide -Mg Supplement 400 (240 Mg) MG tablet 30 tablet     fluticasone (FLONASE) 50 MCG/ACT nasal spray 16 g 3     Si sprays into the nostril(s) as directed by provider Daily.    gabapentin (NEURONTIN) 100 MG capsule 270 capsule 0     Sig: Take 1 capsule by mouth 3 (Three) Times a Day for 90 days.    ibuprofen (ADVIL,MOTRIN) 800 MG tablet 120 tablet 6     Sig: Take 1 tablet by mouth Every 6 (Six) Hours As Needed for Moderate Pain.        Pharmacy where request should be sent: [facility] [phone] [place], [place] [phone]      Last office visit with prescribing clinician: 10/17/2023   Last telemedicine visit with prescribing clinician: Visit date not found   Next office visit with prescribing clinician: 2024     Additional details provided by patient: PATIENT IS REQUESTING 90 DAY SUPPLY WITH 3 ADDITIONAL REFILLS.     Does the patient have less than a 3 day supply:  [] Yes  [x] No    Would you like a call back once the refill request has been completed: [] Yes [x] No    If the office needs to give you a call back, can they leave a voicemail: [] Yes [x] No    Brenden Domingo Rep   23 11:53 EST      [place]

## 2024-04-30 NOTE — PATIENT PROFILE ADULT - NSPRESCRALCSIXMORE_GEN_A_NUR
Physical Therapy Visit    Visit Type: Daily Treatment Note  Visit: 6  Referring Provider: Zak Babb MD  Medical Diagnosis (from order): M25.551, M25.552 - Bilateral hip pain   Patient alert and oriented X3.    SUBJECTIVE                                                                                                               Patient followed up with his doctor in [place]. They are going to hold on any further surgery at this time with his rib area because it's healing better today.       Pain / Symptoms  - Pain rating (out of 10): Current: 0       OBJECTIVE                                                                                                                                     Treatment     Therapeutic Exercise  Seated stepper level 3 seat 10 x 5 minute     bilateral:   Single knee to chest stretch   Hip internal rotation/external rotation stretches  Supine piriformis stretch  Supine hamstring stretch   Supine lower extremity nerve glides  Supine hip flexor stretching    Hooklying:   -Quadratus lumborum stretch with crossed knees 5 x 5 seconds     Seated spinal rotation/extension in chair holding blue med ball x 10 each side  Seated spinal extension in chair holding blue med ball x 10     Red med ball pass overhead x 10 each way   Red med ball pass behind neck x 10 each way     Forward lunge with foot on 2nd step with overhead reach and look to the lunge side x 10 each side  Forward lunge with foot on 2nd step with overhead reach and look opposite the lunge side x 10 each side  Forward lunge hip drivers holding onto railings x 10 each leg     Side lunge position with side bending reach overhead x 10 each side       Skilled input: as detailed above    Writer verbally educated and received verbal consent for hand placement, positioning of patient, and techniques to be performed today from patient for clothing adjustments for techniques, hand placement and palpation for techniques and therapist  position for techniques as described above and how they are pertinent to the patient's plan of care.  Home Exercise Program  Access Code: [email or web address]  URL: [email or web address]  Date: 04/17/2024  Prepared by: Erwin Theodore    Exercises  - Supine Quadratus Lumborum Stretch  - 2 x daily - 1 sets - 10 reps - 10 seconds hold  - Supine Bridge  - 2 x daily - 7 x weekly - 3 sets - 10 reps - 3 sec hold  - Active Straight Leg Raise with Quad Set  - 2 x daily - 7 x weekly - 3 sets - 10 reps - 3 sec hold  - Seated Hamstring Stretch  - 2 x daily - 7 x weekly - 1 sets - 2 reps - 30 sec hold  - Seated Piriformis Stretch with Trunk Bend  - 2 x daily - 7 x weekly - 1 sets - 2 reps - 30 sec hold  - Supine Sciatic Nerve Glide  - 1 x daily - 7 x weekly - 3 sets - 10 reps  - Supine Pelvic Clock  - 2 x daily - 2 sets - 10 reps  - Sidelying Open Book  - 2 x daily - 2 sets - 10 reps  - Prone Press Up  - 2 x daily - 2 sets - 10 reps - 3 seconds hold  - Cat Cow  - 2 x daily - 2 sets - 10 reps  - Child's Pose Stretch  - 2 x daily - 1 sets - 10 reps - 5 seconds hold  - Seated Thoracic Extension Arms Overhead  - 2 x daily - 2 sets - 10 reps      ASSESSMENT                                                                                                            Patient was able to perform full body movements today. Felt most restriction in the thoracic spine area during the session.   Pain/symptoms after session (out of 10): 0  Education:   - Present and ready to learn: patient  - Results of above outlined education: Verbalizes understanding and Demonstrates understanding    PLAN                                                                                                                           Suggestions for next session as indicated: Progress per plan of care- modalities and manual services for pain and range of motion, respectively. Focus on hip strength, lumbar mobility, sciatic neural tension bilaterally.         Therapy procedure time and total treatment time can be found documented on the Time Entry flowsheet     Less than monthly